# Patient Record
Sex: FEMALE | Race: WHITE | NOT HISPANIC OR LATINO | Employment: UNEMPLOYED | URBAN - METROPOLITAN AREA
[De-identification: names, ages, dates, MRNs, and addresses within clinical notes are randomized per-mention and may not be internally consistent; named-entity substitution may affect disease eponyms.]

---

## 2017-01-11 ENCOUNTER — ROUTINE PRENATAL (OUTPATIENT)
Dept: OBGYN | Facility: CLINIC | Age: 32
End: 2017-01-11
Payer: COMMERCIAL

## 2017-01-11 VITALS — DIASTOLIC BLOOD PRESSURE: 72 MMHG | WEIGHT: 293 LBS | BODY MASS INDEX: 49.32 KG/M2 | SYSTOLIC BLOOD PRESSURE: 114 MMHG

## 2017-01-11 DIAGNOSIS — O09.92 HIGH-RISK PREGNANCY, SECOND TRIMESTER: ICD-10-CM

## 2017-01-11 PROCEDURE — 90040 PR PRENATAL FOLLOW UP: CPT | Performed by: OBSTETRICS & GYNECOLOGY

## 2017-01-11 NOTE — PROGRESS NOTES
Pt here for OB F/V. Good fetal movement. Denies LOF, VB.  Marleni report 12/28 in media, follow up later this month  Seq 1 and 2 done

## 2017-01-11 NOTE — MR AVS SNAPSHOT
Little Mitchell   2017 1:00 PM   Routine Prenatal   MRN: [id number]    Department[facility]  [facility]   Dept Phone:  [phone]    Description:  Female : 1985   Provider:  Rajan Mora M.D.           Allergies as of 2017     Allergen Noted Reactions    Asa [Aspirin] 2014       tachycardia      You were diagnosed with     High-risk pregnancy, second trimester   [369582]         Vital Signs     Blood Pressure Weight Last Menstrual Period Smoking Status          114/72 mmHg 142.883 kg (315 lb) 2016 Never Smoker         Basic Information     Date Of Birth Sex Race Ethnicity Preferred Language    1985 Female White Non- English      Your appointments     2017 10:30 AM   OB Follow Up with Rajan Mora M.D.   [facility] ([address], [address]  [facility] [address] [phone]              Problem List              ICD-10-CM Priority Class Noted - Resolved    High-risk pregnancy O09.90   11/3/2016 - Present    Morbid obesity due to excess calories (HCC) E66.01   11/3/2016 - Present    SLE with normal kidneys (HCC) M32.9   2016 - Present      Health Maintenance        Date Due Completion Dates    IMM INFLUENZA (1) 2016 1/3/2013, 2011    PAP SMEAR 2017    IMM DTaP/Tdap/Td Vaccine (4 - Td) 2021, 10/17/2007, 2003            Current Immunizations     Hepatitis B Vaccine Non-Recombivax (Ped/Adol) 2000, 2000, 1999    Influenza Vaccine Quad Inj (Preserved) 1/3/2013, 2011    MMR Vaccine 10/1/2009, 1988    TD Vaccine 2003    Tdap Vaccine 2011, 10/17/2007    Tuberculin Skin Test 12/10/2015      Below and/or attached are the medications your provider expects you to take. Review all of your home medications and newly ordered medications with your provider and/or pharmacist. Follow medication instructions as directed by your provider  and/or pharmacist. Please keep your medication list with you and share with your provider. Update the information when medications are discontinued, doses are changed, or new medications (including over-the-counter products) are added; and carry medication information at all times in the event of emergency situations     Allergies:  ASA - (reactions not documented)               Medications  Valid as of: January 11, 2017 -  1:32 PM    Generic Name Brand Name Tablet Size Instructions for use    Cyclobenzaprine HCl (Tab) FLEXERIL 10 MG Take 1 Tab by mouth 3 times a day as needed for Mild Pain.        Cyclobenzaprine HCl (Tab) FLEXERIL 10 MG Take 1 Tab by mouth 3 times a day as needed for Mild Pain.        Hydrocodone-Acetaminophen (Tab) NORCO 5-325 MG Take 1-2 Tabs by mouth every four hours as needed.        MethylPREDNISolone (Tab) MEDROL 4 MG 6 DAY TAPER        Naproxen (Tab) NAPROSYN 500 MG Take 1 Tab by mouth 2 times a day, with meals.        .                 Medicines prescribed today were sent to:     None      Medication refill instructions:       If your prescription bottle indicates you have medication refills left, it is not necessary to call your provider’s office. Please contact your pharmacy and they will refill your medication.    If your prescription bottle indicates you do not have any refills left, you may request refills at any time through one of the following ways: The online [employer] system (except Urgent Care), by calling your provider’s office, or by asking your pharmacy to contact your provider’s office with a refill request. Medication refills are processed only during regular business hours and may not be available until the next business day. Your provider may request additional information or to have a follow-up visit with you prior to refilling your medication.   *Please Note: Medication refills are assigned a new Rx number when refilled electronically. Your pharmacy may indicate that no  refills were authorized even though a new prescription for the same medication is available at the pharmacy. Please request the medicine by name with the pharmacy before contacting your provider for a refill.           [employer] Access Code: Activation code not generated  Current "[employer]t Status: Active

## 2017-01-11 NOTE — PROGRESS NOTES
OB Followup;    21w5d    Patient Active Problem List    Diagnosis Date Noted   • SLE with normal kidneys (HCC) 12/05/2016   • High-risk pregnancy 11/03/2016   • Morbid obesity due to excess calories (HCC) 11/03/2016       Filed Vitals:    01/11/17 1300   BP: 114/72   Weight: 142.883 kg (315 lb)       Patient presents for followup of OB care. Currently doing well . Good fetal movement no leakage of fluid no contractions or vaginal bleeding        Size equals dates, normal fetal heart rate      Labs; unable to find results of PHILIP to confirm diagnosis of SLE      Patient has had sequential screening with Dr. Yepez-normal      Labor precautions given  Increase oral hydration    Followup in  4 weeks

## 2017-02-14 ENCOUNTER — ROUTINE PRENATAL (OUTPATIENT)
Dept: OBGYN | Facility: CLINIC | Age: 32
End: 2017-02-14
Payer: COMMERCIAL

## 2017-02-14 VITALS — WEIGHT: 293 LBS | DIASTOLIC BLOOD PRESSURE: 68 MMHG | SYSTOLIC BLOOD PRESSURE: 110 MMHG | BODY MASS INDEX: 48.79 KG/M2

## 2017-02-14 DIAGNOSIS — O09.92 HIGH-RISK PREGNANCY, SECOND TRIMESTER: ICD-10-CM

## 2017-02-14 PROCEDURE — 90040 PR PRENATAL FOLLOW UP: CPT | Performed by: OBSTETRICS & GYNECOLOGY

## 2017-02-14 NOTE — MR AVS SNAPSHOT
Little Mitchell   2017 10:30 AM   Routine Prenatal   MRN: [id number]    [facility]   Dept Phone:  [phone]    Description:  Female : 1985   Provider:  Rajan Mora M.D.           Allergies as of 2017     Allergen Noted Reactions    Asa [Aspirin] 2014       tachycardia      You were diagnosed with     High-risk pregnancy, second trimester   [204320]         Vital Signs     Blood Pressure Weight Last Menstrual Period Smoking Status          110/68 mmHg 141.341 kg (311 lb 9.6 oz) 2016 Never Smoker         Basic Information     Date Of Birth Sex Race Ethnicity Preferred Language    1985 Female White Non- English      Your appointments     Mar 14, 2017 10:30 AM   OB Follow Up with Rajan Mora M.D.   [facility] ([address], [address]  [facility] [id number] [phone]              Problem List              ICD-10-CM Priority Class Noted - Resolved    High-risk pregnancy O09.90   11/3/2016 - Present    Morbid obesity due to excess calories (CMS-HCC) E66.01   11/3/2016 - Present    SLE with normal kidneys (CMS-HCC) M32.9   2016 - Present      Health Maintenance        Date Due Completion Dates    IMM INFLUENZA (1) 2016 1/3/2013, 2011    PAP SMEAR 2017    IMM DTaP/Tdap/Td Vaccine (4 - Td) 2021, 10/17/2007, 2003            Current Immunizations     Hepatitis B Vaccine Non-Recombivax (Ped/Adol) 2000, 2000, 1999    Influenza Vaccine Quad Inj (Preserved) 1/3/2013, 2011    MMR Vaccine 10/1/2009, 1988    TD Vaccine 2003    Tdap Vaccine 2011, 10/17/2007    Tuberculin Skin Test 12/10/2015      Below and/or attached are the medications your provider expects you to take. Review all of your home medications and newly ordered medications with your provider and/or pharmacist. Follow medication instructions as directed by  your provider and/or pharmacist. Please keep your medication list with you and share with your provider. Update the information when medications are discontinued, doses are changed, or new medications (including over-the-counter products) are added; and carry medication information at all times in the event of emergency situations     Allergies:  ASA - (reactions not documented)               Medications  Valid as of: February 14, 2017 - 11:05 AM    Generic Name Brand Name Tablet Size Instructions for use    Cyclobenzaprine HCl (Tab) FLEXERIL 10 MG Take 1 Tab by mouth 3 times a day as needed for Mild Pain.        Cyclobenzaprine HCl (Tab) FLEXERIL 10 MG Take 1 Tab by mouth 3 times a day as needed for Mild Pain.        Hydrocodone-Acetaminophen (Tab) NORCO 5-325 MG Take 1-2 Tabs by mouth every four hours as needed.        MethylPREDNISolone (Tab) MEDROL 4 MG 6 DAY TAPER        Naproxen (Tab) NAPROSYN 500 MG Take 1 Tab by mouth 2 times a day, with meals.        .                 Medicines prescribed today were sent to:     None      Medication refill instructions:       If your prescription bottle indicates you have medication refills left, it is not necessary to call your provider’s office. Please contact your pharmacy and they will refill your medication.    If your prescription bottle indicates you do not have any refills left, you may request refills at any time through one of the following ways: The online [employer] system (except Urgent Care), by calling your provider’s office, or by asking your pharmacy to contact your provider’s office with a refill request. Medication refills are processed only during regular business hours and may not be available until the next business day. Your provider may request additional information or to have a follow-up visit with you prior to refilling your medication.   *Please Note: Medication refills are assigned a new Rx number when refilled electronically. Your pharmacy may  indicate that no refills were authorized even though a new prescription for the same medication is available at the pharmacy. Please request the medicine by name with the pharmacy before contacting your provider for a refill.        Your To Do List     Future Labs/Procedures Complete By Expires    CBC WITH DIFFERENTIAL  As directed 2/14/2018    GLUCOSE 1HR GESTATIONAL  As directed 2/15/2018    T.PALLIDUM AB EIA  As directed 2/15/2018         MyChart Access Code: Activation code not generated  Current MyChart Status: Active

## 2017-02-14 NOTE — PROGRESS NOTES
OB Followup;    26w4d    Patient Active Problem List    Diagnosis Date Noted   • SLE with normal kidneys (CMS-HCC) 12/05/2016   • High-risk pregnancy 11/03/2016   • Morbid obesity due to excess calories (CMS-HCC) 11/03/2016       Filed Vitals:    02/14/17 1049   BP: 110/68   Weight: 141.341 kg (311 lb 9.6 oz)       Patient presents for followup of OB care. Currently doing well . Good fetal movement no leakage of fluid no contractions or vaginal bleeding        Size equals dates, normal fetal heart rate      Labs; patient given lab slip for CBC GTT and RPR    Labor precautions given  Increase oral hydration    Followup in 4 weeks

## 2017-02-27 ENCOUNTER — TELEPHONE (OUTPATIENT)
Dept: OBGYN | Facility: CLINIC | Age: 32
End: 2017-02-27

## 2017-02-27 DIAGNOSIS — O09.93 HIGH-RISK PREGNANCY, THIRD TRIMESTER: ICD-10-CM

## 2017-02-27 NOTE — TELEPHONE ENCOUNTER
Called pt back   Informed her that it can be done with an amenositsis or after the baby is born  Pt verblaized understanding

## 2017-03-03 ENCOUNTER — TELEPHONE (OUTPATIENT)
Dept: OBGYN | Facility: CLINIC | Age: 32
End: 2017-03-03

## 2017-03-03 NOTE — TELEPHONE ENCOUNTER
Patient called in regards to a lab order for ppd test. She stated that she talked to zechariah about this. I looked in the system and the order was cancelled. I spoke with zechariah and she said the pt would need to get it from her PCP

## 2017-03-12 ENCOUNTER — HOSPITAL ENCOUNTER (OUTPATIENT)
Facility: MEDICAL CENTER | Age: 32
End: 2017-03-13
Attending: OBSTETRICS & GYNECOLOGY | Admitting: OBSTETRICS & GYNECOLOGY
Payer: COMMERCIAL

## 2017-03-12 LAB — FIBRONECTIN FETAL SPEC QL: NEGATIVE

## 2017-03-12 PROCEDURE — 82731 ASSAY OF FETAL FIBRONECTIN: CPT

## 2017-03-12 PROCEDURE — 84112 EVAL AMNIOTIC FLUID PROTEIN: CPT

## 2017-03-13 VITALS — SYSTOLIC BLOOD PRESSURE: 137 MMHG | DIASTOLIC BLOOD PRESSURE: 69 MMHG | HEART RATE: 90 BPM

## 2017-03-13 LAB — A1 MICROGLOB PLACENTAL VAG QL: NEGATIVE

## 2017-03-13 PROCEDURE — 59025 FETAL NON-STRESS TEST: CPT | Performed by: OBSTETRICS & GYNECOLOGY

## 2017-03-13 NOTE — PROGRESS NOTES
"31 y.o. , EDC 17 (30.2 wks)     -Pt c/o contractions that started this morning, \"maybe a couple times an hour and I feel like I need to push when I have a contraction.\" Also c/o leaking since yesterday (clear fluid, with no big gush)Also c/o decresed fetal movement since this morning, \"I've been drinking apple juice all day but haven't felt him kick as much as he normally does.\"   -Phone call to Dr Ruffin for patient update. Order to collect amnisure & FFN then check cervix.     -FFN & Amnisure collected, sent to lab. No pooling seen with sterile spec. SVE= external os 1cm, internal os closed/thick/-2, unable to assess presenting part. Pt has felt baby kick 3 times since being here, occasional fetal movement heard on US by RN  0-Amnisure & FFN are negative, pt has felt great fetal movement since she's been at the hospital & her contractions have reportedly lessened in frequency after drinking a pitcher of water. No visible leaking of fluid noted on pad since pt has been at hospital. When pt has a reported contraction, no contraction is palpated by RN.   Pt requesting to go home & wants a doctor's note for work. Dr Augustin palmer MD reviewed strip, order received to discharge pt.   0-Pt discharged home in stable condition with discharge instructions & education of when to return to hospital.   "

## 2017-03-14 ENCOUNTER — ROUTINE PRENATAL (OUTPATIENT)
Dept: OBGYN | Facility: CLINIC | Age: 32
End: 2017-03-14
Payer: COMMERCIAL

## 2017-03-14 VITALS — DIASTOLIC BLOOD PRESSURE: 82 MMHG | BODY MASS INDEX: 47.45 KG/M2 | WEIGHT: 293 LBS | SYSTOLIC BLOOD PRESSURE: 132 MMHG

## 2017-03-14 DIAGNOSIS — O09.93 HIGH-RISK PREGNANCY, THIRD TRIMESTER: ICD-10-CM

## 2017-03-14 PROCEDURE — 90040 PR PRENATAL FOLLOW UP: CPT | Performed by: OBSTETRICS & GYNECOLOGY

## 2017-03-14 NOTE — PROGRESS NOTES
Pt here for OB F/V. Good fetal movement. Denies, VB.  L+D 3/12-- contractions, LOF, DFM. FFN -  28 week labs not done yet  Pt had Rhogam in ER- proof of inj scanned in media

## 2017-03-14 NOTE — PROGRESS NOTES
OB Followup;    30w4d    Patient Active Problem List    Diagnosis Date Noted   • SLE with normal kidneys (CMS-HCC) 2016   • High-risk pregnancy 2016   • Morbid obesity due to excess calories (CMS-HCC) 2016       Filed Vitals:    17 1027   BP: 132/82   Weight: 137.44 kg (303 lb)       Patient presents for followup of OB care. Currently doing well . Good fetal movement no leakage of fluid no contractions or vaginal bleeding        Size equals dates, normal fetal heart rate      Labs; patient still has not perform CBC GTT and RPR-she states she will perform labs this weekend  Patient was seen on labor and delivery for  contractions FFN negative cervix 1 cm dilated and the patient did receive RhoGAM on labor and delivery    Labor precautions given  Increase oral hydration    Followup in 2 weeks    Recommend NSTs twice weekly starting at 32 weeks due to history of lupus

## 2017-03-14 NOTE — MR AVS SNAPSHOT
Little Mitchell   3/14/2017 10:30 AM   Routine Prenatal   MRN: [id number]    Department:  [facility]   Dept Phone:  [phone]    Description:  Female : 1985   Provider:  Rajan Mora M.D.           Allergies as of 3/14/2017     Allergen Noted Reactions    Asa [Aspirin] 2014       tachycardia      You were diagnosed with     High-risk pregnancy, third trimester   [365127]         Vital Signs     Blood Pressure Weight Last Menstrual Period Smoking Status          132/82 mmHg 137.44 kg (303 lb) 2016 Never Smoker         Basic Information     Date Of Birth Sex Race Ethnicity Preferred Language    1985 Female White Non- English      Your appointments     2017 10:45 AM   OB Follow Up with Rajan Mora M.D.   [facility] ([address])    [address] [place] [phone]            2017 10:30 AM   OB Follow Up with Rajan Mora M.D.   [facility] ([address])    [address] [place] [phone]            2017 10:30 AM   OB Follow Up with Rajan Mora M.D.   [facility] ([address])    [address] [place] [phone]            May 03, 2017 10:00 AM   OB Follow Up with Rajan Mora M.D.   [address] [phone]            May 09, 2017 10:30 AM   OB Follow Up with Rajan Mora M.D.   [address] [phone]            May 15, 2017 10:30 AM   OB Follow Up with Rajan Mora M.D.   [address] [phone]              Problem List              ICD-10-CM Priority Class Noted -  Resolved    High-risk pregnancy O09.90   11/3/2016 - Present    Morbid obesity due to excess calories (CMS-HCC) E66.01   11/3/2016 - Present    SLE with normal kidneys (CMS-HCC) M32.9   12/5/2016 - Present      Health Maintenance        Date Due Completion Dates    IMM INFLUENZA (1) 9/1/2016 1/3/2013, 8/31/2011    PAP SMEAR 2/2/2017 2/2/2014    IMM DTaP/Tdap/Td Vaccine (4 - Td) 5/23/2021 5/23/2011, 10/17/2007, 5/8/2003            Current Immunizations     Hepatitis B Vaccine Non-Recombivax (Ped/Adol) 5/23/2000, 1/26/2000, 11/23/1999    Influenza Vaccine Quad Inj (Preserved) 1/3/2013, 8/31/2011    MMR Vaccine 10/1/2009, 9/21/1988    TD Vaccine 5/8/2003    Tdap Vaccine 5/23/2011, 10/17/2007    Tuberculin Skin Test 12/10/2015      Below and/or attached are the medications your provider expects you to take. Review all of your home medications and newly ordered medications with your provider and/or pharmacist. Follow medication instructions as directed by your provider and/or pharmacist. Please keep your medication list with you and share with your provider. Update the information when medications are discontinued, doses are changed, or new medications (including over-the-counter products) are added; and carry medication information at all times in the event of emergency situations     Allergies:  ASA - (reactions not documented)               Medications  Valid as of: March 14, 2017 - 10:40 AM    Generic Name Brand Name Tablet Size Instructions for use    Cyclobenzaprine HCl (Tab) FLEXERIL 10 MG Take 1 Tab by mouth 3 times a day as needed for Mild Pain.        Cyclobenzaprine HCl (Tab) FLEXERIL 10 MG Take 1 Tab by mouth 3 times a day as needed for Mild Pain.        Hydrocodone-Acetaminophen (Tab) NORCO 5-325 MG Take 1-2 Tabs by mouth every four hours as needed.        MethylPREDNISolone (Tab) MEDROL 4 MG 6 DAY TAPER        Naproxen (Tab) NAPROSYN 500 MG Take 1 Tab by mouth 2 times a day, with meals.        .                   Medicines prescribed today were sent to:     None      Medication refill instructions:       If your prescription bottle indicates you have medication refills left, it is not necessary to call your provider’s office. Please contact your pharmacy and they will refill your medication.    If your prescription bottle indicates you do not have any refills left, you may request refills at any time through one of the following ways: The online [employer] system (except Urgent Care), by calling your provider’s office, or by asking your pharmacy to contact your provider’s office with a refill request. Medication refills are processed only during regular business hours and may not be available until the next business day. Your provider may request additional information or to have a follow-up visit with you prior to refilling your medication.   *Please Note: Medication refills are assigned a new Rx number when refilled electronically. Your pharmacy may indicate that no refills were authorized even though a new prescription for the same medication is available at the pharmacy. Please request the medicine by name with the pharmacy before contacting your provider for a refill.           [employer] Access Code: Activation code not generated  Current [employer] Status: Active

## 2017-04-03 ENCOUNTER — HOSPITAL ENCOUNTER (OUTPATIENT)
Facility: MEDICAL CENTER | Age: 32
End: 2017-04-03
Attending: OBSTETRICS & GYNECOLOGY | Admitting: OBSTETRICS & GYNECOLOGY
Payer: COMMERCIAL

## 2017-04-03 VITALS
TEMPERATURE: 98.1 F | SYSTOLIC BLOOD PRESSURE: 133 MMHG | HEART RATE: 89 BPM | HEIGHT: 60 IN | WEIGHT: 293 LBS | DIASTOLIC BLOOD PRESSURE: 86 MMHG | BODY MASS INDEX: 57.52 KG/M2

## 2017-04-03 LAB
APPEARANCE UR: CLEAR
COLOR UR AUTO: YELLOW
FIBRONECTIN FETAL SPEC QL: NEGATIVE
GLUCOSE UR QL STRIP.AUTO: NEGATIVE MG/DL
KETONES UR QL STRIP.AUTO: NEGATIVE MG/DL
LEUKOCYTE ESTERASE UR QL STRIP.AUTO: ABNORMAL
NITRITE UR QL STRIP.AUTO: NEGATIVE
PH UR STRIP.AUTO: 6 [PH]
PROT UR QL STRIP: NEGATIVE MG/DL
RBC UR QL AUTO: NEGATIVE
SP GR UR: 1.01

## 2017-04-03 PROCEDURE — 81002 URINALYSIS NONAUTO W/O SCOPE: CPT

## 2017-04-03 PROCEDURE — 82731 ASSAY OF FETAL FIBRONECTIN: CPT

## 2017-04-03 PROCEDURE — 59025 FETAL NON-STRESS TEST: CPT | Performed by: OBSTETRICS & GYNECOLOGY

## 2017-04-04 NOTE — PROGRESS NOTES
Presents with perineal pressure, swollen vagina, light spotting, a sharp pain that starts at her upper abdomen and extends to her vagina, and abd tenderness and diarrhea. Denies LOF; reports FM.  Report given to Dr. Flores, orders received. FFN collected and SVE 1/thick/high; unable to reach presenting part. FFN negative, d/c orders obtained. PTL precautions provided; educated about the importance of doing kick counts and drinking plenty of water

## 2017-04-06 ENCOUNTER — ROUTINE PRENATAL (OUTPATIENT)
Dept: OBGYN | Facility: CLINIC | Age: 32
End: 2017-04-06
Payer: COMMERCIAL

## 2017-04-06 VITALS — WEIGHT: 293 LBS | BODY MASS INDEX: 58 KG/M2 | SYSTOLIC BLOOD PRESSURE: 118 MMHG | DIASTOLIC BLOOD PRESSURE: 82 MMHG

## 2017-04-06 DIAGNOSIS — Q33.0 CONGENITAL PULMONARY AIRWAY MALFORMATION (CPAM): ICD-10-CM

## 2017-04-06 DIAGNOSIS — O09.93 HIGH-RISK PREGNANCY, THIRD TRIMESTER: ICD-10-CM

## 2017-04-06 PROCEDURE — 90040 PR PRENATAL FOLLOW UP: CPT | Performed by: OBSTETRICS & GYNECOLOGY

## 2017-04-06 NOTE — MR AVS SNAPSHOT
Little Mitchell   2017 10:45 AM   Routine Prenatal   MRN: [id number]    Department:  [facility]   Dept Phone:  [phone]    Description:  Female : 1985   Provider:  Rajan Mora M.D.           Allergies as of 2017     Allergen Noted Reactions    Asa [Aspirin] 2014       tachycardia      You were diagnosed with     High-risk pregnancy, third trimester   [493692]         Vital Signs     Blood Pressure Weight Last Menstrual Period Smoking Status          118/82 mmHg 134.718 kg (297 lb) 2016 Never Smoker         Basic Information     Date Of Birth Sex Race Ethnicity Preferred Language    1985 Female White Non- English      Your appointments     2017 10:30 AM   OB Follow Up with Rajan Mora M.D.   [facility] ([address])    [address] [place] [phone]            2017 10:30 AM   OB Follow Up with Rajan Mora M.D.   [facility] ([address])    [address] [place] [phone]            May 03, 2017 10:00 AM   OB Follow Up with Rajan Mora M.D.   [facility] ([address])    [address] [place] [phone]            May 07, 2017  8:00 AM   [facility] INDUCTION OF LABOR with NON-SURGICAL L&D   LABOR & DELIVERY [facility] (--)    [address] [facility] [address]   [phone]            May 09, 2017 10:30 AM   OB Follow Up with Rajan Mora M.D.   [facility] ([address])    [address] [place] [phone]            May 15, 2017 10:30 AM   OB Follow Up with Rajan Mora M.D.   [facility] ([address])    [address] [place] [address] [phone]              Problem List              ICD-10-CM Priority Class Noted - Resolved    High-risk pregnancy  O09.90   11/3/2016 - Present    Morbid obesity due to excess calories (CMS-HCC) E66.01   11/3/2016 - Present    SLE with normal kidneys (CMS-HCC) M32.9   12/5/2016 - Present      Health Maintenance        Date Due Completion Dates    PAP SMEAR 2/2/2017 2/2/2014    IMM DTaP/Tdap/Td Vaccine (4 - Td) 5/23/2021 5/23/2011, 10/17/2007, 5/8/2003            Current Immunizations     Hepatitis B Vaccine Non-Recombivax (Ped/Adol) 5/23/2000, 1/26/2000, 11/23/1999    Influenza Vaccine Quad Inj (Preserved) 1/3/2013, 8/31/2011    MMR Vaccine 10/1/2009, 9/21/1988    TD Vaccine 5/8/2003    Tdap Vaccine 5/23/2011, 10/17/2007    Tuberculin Skin Test 12/10/2015      Below and/or attached are the medications your provider expects you to take. Review all of your home medications and newly ordered medications with your provider and/or pharmacist. Follow medication instructions as directed by your provider and/or pharmacist. Please keep your medication list with you and share with your provider. Update the information when medications are discontinued, doses are changed, or new medications (including over-the-counter products) are added; and carry medication information at all times in the event of emergency situations     Allergies:  ASA - (reactions not documented)               Medications  Valid as of: April 06, 2017 - 11:26 AM    Generic Name Brand Name Tablet Size Instructions for use    Cyclobenzaprine HCl (Tab) FLEXERIL 10 MG Take 1 Tab by mouth 3 times a day as needed for Mild Pain.        Cyclobenzaprine HCl (Tab) FLEXERIL 10 MG Take 1 Tab by mouth 3 times a day as needed for Mild Pain.        Hydrocodone-Acetaminophen (Tab) NORCO 5-325 MG Take 1-2 Tabs by mouth every four hours as needed.        MethylPREDNISolone (Tab) MEDROL 4 MG 6 DAY TAPER        Naproxen (Tab) NAPROSYN 500 MG Take 1 Tab by mouth 2 times a day, with meals.        .                 Medicines prescribed today were sent to:     [id number] - [place]UNA FORD  - [address] [place], [place] [address]TARYN [address] [place] [address]    Phone: [phone] Fax: [phone]    Open 24 Hours?: No      Medication refill instructions:       If your prescription bottle indicates you have medication refills left, it is not necessary to call your provider’s office. Please contact your pharmacy and they will refill your medication.    If your prescription bottle indicates you do not have any refills left, you may request refills at any time through one of the following ways: The online [employer] system (except Urgent Care), by calling your provider’s office, or by asking your pharmacy to contact your provider’s office with a refill request. Medication refills are processed only during regular business hours and may not be available until the next business day. Your provider may request additional information or to have a follow-up visit with you prior to refilling your medication.   *Please Note: Medication refills are assigned a new Rx number when refilled electronically. Your pharmacy may indicate that no refills were authorized even though a new prescription for the same medication is available at the pharmacy. Please request the medicine by name with the pharmacy before contacting your provider for a refill.           [employer] Access Code: Activation code not generated  Current [employer] Status: Active

## 2017-04-06 NOTE — Clinical Note
"Count Your Baby's Movements  Another step to a healthy delivery                Dept: [phone]    How Many Weeks Pregnant? 33w6d    Date to Begin Countin17              How to use this chart    One way for your physician to keep track of your baby's health is by knowing how often the baby moves (or \"kicks\") in your womb.  You can help your physician to do this by using this chart every day.    Every day, you should see how many hours it takes for your baby to move 10 times.  Start in the morning, as soon as you get up.    · First, write down the time your baby moves until you get to 10.  · Check off one box every time your baby moves until you get to 10.  · Write down the time you finished counting in the last column.  · Total how long it took to count up all 10 movements.  · Finally, fill in the box that shows how long this took.  After counting 10 movements, you no longer have to count any more that day.  The next morning, just start counting again as soon as you get up.    What should you call a \"movement\"?  It is hard to say, because it will feel different from one mother to another and from one pregnancy to the next.  The important thing is that you count the movements the same way throughout your pregnancy.  If you have more questions, you should ask your physician.    Count carefully every day!  SAMPLE:  Week 28    How many hours did it take to feel 10 movements?       Start  Time     1     2     3     4     5     6     7     8     9     10   Finish Time   Mon 8:20 ·  ·  ·  ·  ·  ·  ·  ·  ·  ·  11:40                  Sat               Sun                 IMPORTANT: You should contact your physician if it takes more than two hours for you to feel 10 movements.  Each morning, write down the time and start to count the movements of your baby.  Keep track by checking off one box every time you feel one movement.  When you have felt 10 \"kicks\", " write down the time you finished counting in the last column.  Then fill in the   box (over the check babs) for the number of hours it took.  Be sure to read the complete instructions on the previous page.

## 2017-04-06 NOTE — PROGRESS NOTES
OB Followup;    33w6d    Patient Active Problem List    Diagnosis Date Noted   • SLE with normal kidneys (CMS-HCC) 12/05/2016   • High-risk pregnancy 11/03/2016   • Morbid obesity due to excess calories (CMS-HCC) 11/03/2016       Filed Vitals:    04/06/17 1109   BP: 118/82   Weight: 134.718 kg (297 lb)       Patient presents for followup of OB care. Currently doing well . Good fetal movement no leakage of fluid no contractions or vaginal bleeding        Size equals dates, normal fetal heart rate    Patient since she was    Labor precautions given  Increase oral hydration    Followup in 2 weeks        Most recent ultrasound on 3/20/17 with Dr. Yepez shows slightly smaller measurement of CPAM measuring 3.47 cm x 2.60 cm x 2.57 cm but there is no evidence of hydrops pericardial effusion or pleural effusion.  In addition, no evidence of IUGR or oligohydramnios at this time        The patient states that she has pressure in her vagina from sitting while at work as a . We discussed this is a normal part of pregnancy and not an abnormal pathologic problem or diagnosis.  The patient states that she would like to start her maternity leave right now-a letter was given to the patient to start maternity leave-we cannot give her a letter stating that she has a medical complication necessitating medical leave for this indication.    Dr. Yepez indicated that delivery should occur at approximately 38 weeks-induction of labor has been scheduled for May 7 at 8 AM due to CPAM which has a risk of causing fetal hydrops and can be associated with IUGR     In addition, Dr. Tony has been notified about congenital pulmonary airway malformation for follow-up after delivery and possible surgery is needed.    Patient has scheduled follow up for fetal growth studies Dopplers and measurement of congenital pulmonary airway malformation with Dr. Yepez

## 2017-04-06 NOTE — Clinical Note
April 6, 2017       Patient: Little Mitchell   YOB: 1985   Date of Visit: 4/6/2017         To Whom It May Concern:    It is my medical opinion that Little Mitchell should start her maternity leave today (4/6/2017).    If you have any questions or concerns, please don't hesitate to call [phone]          Sincerely,          Rajan Mora M.D.  Electronically Signed

## 2017-04-18 ENCOUNTER — ROUTINE PRENATAL (OUTPATIENT)
Dept: OBGYN | Facility: CLINIC | Age: 32
End: 2017-04-18
Payer: COMMERCIAL

## 2017-04-18 ENCOUNTER — HOSPITAL ENCOUNTER (OUTPATIENT)
Facility: MEDICAL CENTER | Age: 32
End: 2017-04-18
Attending: OBSTETRICS & GYNECOLOGY
Payer: COMMERCIAL

## 2017-04-18 VITALS — SYSTOLIC BLOOD PRESSURE: 122 MMHG | DIASTOLIC BLOOD PRESSURE: 82 MMHG | WEIGHT: 293 LBS | BODY MASS INDEX: 58.2 KG/M2

## 2017-04-18 DIAGNOSIS — O09.93 HIGH-RISK PREGNANCY, THIRD TRIMESTER: ICD-10-CM

## 2017-04-18 DIAGNOSIS — M32.9 SLE WITH NORMAL KIDNEYS (HCC): ICD-10-CM

## 2017-04-18 LAB
NST ACOUSTIC STIMULATION: YES
NST ACTION NECESSARY: NORMAL
NST ASSESSMENT: REACTIVE
NST BASELINE: 140
NST INDICATIONS: NORMAL
NST OTHER DATA: NORMAL
NST READ BY: NORMAL
NST RETURN: NORMAL
NST UTERINE ACTIVITY: NORMAL

## 2017-04-18 PROCEDURE — 90040 PR PRENATAL FOLLOW UP: CPT | Performed by: OBSTETRICS & GYNECOLOGY

## 2017-04-18 PROCEDURE — 59025 FETAL NON-STRESS TEST: CPT | Performed by: OBSTETRICS & GYNECOLOGY

## 2017-04-18 PROCEDURE — 87653 STREP B DNA AMP PROBE: CPT

## 2017-04-18 NOTE — PROGRESS NOTES
Pt here for OB F/V. Good fetal movement. Denies LOF, VB.  PT choosing not to do 28 week labs.  GBS today  Wants cervix check

## 2017-04-18 NOTE — PROGRESS NOTES
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 Currently doing well . Good fetal movement no leakage of fluid no contractions or vaginal bleeding        Size equals dates, normal fetal heart rate    Patient's weight has stabilized and climbed slightly-she states her appetite is slightly better    Labs; GBS culture taken    Labor precautions given  Increase oral hydration    Consultation note with Dr. Yepez on 3/30/17 reveals drop from 44th percentile to 26 percentile for growth. Cord Dopplers are normal, JAQUAN 12.3- CPAM has enlarged slightly 3.1 cm x 2.98 cm x 3.3 cm-patient scheduled for follow-up with Dr. Yepez for growth scan    Continue twice weekly NSTs    Patient is scheduled for induction of labor on 5/7/17 at 8 AM as as per Dr. Yepez's recommendations. Fetus cephalic presentation on ultrasound performed by Dr. Yepez

## 2017-04-18 NOTE — MR AVS SNAPSHOT
Little Mitchell   2017 10:30 AM   Routine Prenatal   MRN: [id number]    Department:  [facility]   Dept Phone:  [phone]    Description:  Female : 1985   Provider:  Rajan Mora M.D.           Allergies as of 2017     Allergen Noted Reactions    Asa [Aspirin] 2014       tachycardia      You were diagnosed with     High-risk pregnancy, third trimester   [890188]       SLE with normal kidneys (CMS-HCC)   [937016]         Vital Signs     Blood Pressure Weight Last Menstrual Period Smoking Status          122/82 mmHg 135.172 kg (298 lb) 2016 Never Smoker         Basic Information     Date Of Birth Sex Race Ethnicity Preferred Language    1985 Female White Non- English      Your appointments     2017 10:30 AM   Fetal Non-Stress Test with OBGYN E2 NST ROOM   [facility] ([address])    [address] [place] [id number] [phone]            2017 10:30 AM   OB Follow Up with Rajan Mora M.D.   [facility] ([address])    [address] [place] [address], [address] [phone]            May 03, 2017 10:00 AM   OB Follow Up with Rajan Mora M.D.   [facility] ([address])    [address] [place] [address], [address] [id number] [phone]            May 07, 2017  8:00 AM   TPC INDUCTION OF LABOR with NON-SURGICAL L&D   LABOR & DELIVERY [facility] (--)    [address] [facility] [address]   [phone]              Problem List              ICD-10-CM Priority Class Noted - Resolved    High-risk pregnancy O09.90   11/3/2016 - Present    Morbid obesity due to excess calories (CMS-HCC) E66.01   11/3/2016 - Present    SLE with normal kidneys (CMS-HCC) M32.9   2016 - Present    Congenital pulmonary airway malformation (CPAM) Q33.0   2017 - Present      Health Maintenance        Date Due Completion Dates    PAP SMEAR 2017  2/2/2014    IMM DTaP/Tdap/Td Vaccine (4 - Td) 5/23/2021 5/23/2011, 10/17/2007, 5/8/2003            Current Immunizations     Hepatitis B Vaccine Non-Recombivax (Ped/Adol) 5/23/2000, 1/26/2000, 11/23/1999    Influenza Vaccine Quad Inj (Preserved) 1/3/2013, 8/31/2011    MMR Vaccine 10/1/2009, 9/21/1988    TD Vaccine 5/8/2003    Tdap Vaccine 5/23/2011, 10/17/2007    Tuberculin Skin Test 12/10/2015      Below and/or attached are the medications your provider expects you to take. Review all of your home medications and newly ordered medications with your provider and/or pharmacist. Follow medication instructions as directed by your provider and/or pharmacist. Please keep your medication list with you and share with your provider. Update the information when medications are discontinued, doses are changed, or new medications (including over-the-counter products) are added; and carry medication information at all times in the event of emergency situations     Allergies:  ASA - (reactions not documented)               Medications  Valid as of: April 18, 2017 - 12:09 PM    Generic Name Brand Name Tablet Size Instructions for use    Cyclobenzaprine HCl (Tab) FLEXERIL 10 MG Take 1 Tab by mouth 3 times a day as needed for Mild Pain.        Cyclobenzaprine HCl (Tab) FLEXERIL 10 MG Take 1 Tab by mouth 3 times a day as needed for Mild Pain.        Hydrocodone-Acetaminophen (Tab) NORCO 5-325 MG Take 1-2 Tabs by mouth every four hours as needed.        MethylPREDNISolone (Tab) MEDROL 4 MG 6 DAY TAPER        Naproxen (Tab) NAPROSYN 500 MG Take 1 Tab by mouth 2 times a day, with meals.        .                 Medicines prescribed today were sent to:     [id number] - [place], [place] - [address] [facility] AT [address] [place] [address]    Phone: [phone] Fax: [phone]    Open 24 Hours?: No      Medication refill instructions:       If your prescription bottle indicates you have medication  refills left, it is not necessary to call your provider’s office. Please contact your pharmacy and they will refill your medication.    If your prescription bottle indicates you do not have any refills left, you may request refills at any time through one of the following ways: The online RetiDiag system (except Urgent Care), by calling your provider’s office, or by asking your pharmacy to contact your provider’s office with a refill request. Medication refills are processed only during regular business hours and may not be available until the next business day. Your provider may request additional information or to have a follow-up visit with you prior to refilling your medication.   *Please Note: Medication refills are assigned a new Rx number when refilled electronically. Your pharmacy may indicate that no refills were authorized even though a new prescription for the same medication is available at the pharmacy. Please request the medicine by name with the pharmacy before contacting your provider for a refill.           RetiDiag Access Code: Activation code not generated  Current RetiDiag Status: Active

## 2017-04-19 LAB
AMBIGUOUS DTTM AMBI4: NORMAL
SIGNIFICANT IND 70042: NORMAL
SITE SITE: NORMAL
SOURCE SOURCE: NORMAL

## 2017-04-20 LAB — GP B STREP DNA SPEC QL NAA+PROBE: NEGATIVE

## 2017-04-21 ENCOUNTER — ROUTINE PRENATAL (OUTPATIENT)
Dept: OBGYN | Facility: CLINIC | Age: 32
End: 2017-04-21
Payer: COMMERCIAL

## 2017-04-21 DIAGNOSIS — Q33.0 CONGENITAL PULMONARY AIRWAY MALFORMATION (CPAM): ICD-10-CM

## 2017-04-21 DIAGNOSIS — O28.8 NST (NON-STRESS TEST) NONREACTIVE: ICD-10-CM

## 2017-04-21 LAB
NST ACOUSTIC STIMULATION: YES
NST ACTION NECESSARY: ABNORMAL
NST ASSESSMENT: NONREACTIVE
NST BASELINE: 140
NST INDICATIONS: ABNORMAL
NST OTHER DATA: ABNORMAL
NST READ BY: ABNORMAL
NST RETURN: ABNORMAL
NST UTERINE ACTIVITY: ABNORMAL

## 2017-04-21 PROCEDURE — 90040 PR PRENATAL FOLLOW UP: CPT | Performed by: OBSTETRICS & GYNECOLOGY

## 2017-04-21 PROCEDURE — 76819 FETAL BIOPHYS PROFIL W/O NST: CPT | Performed by: OBSTETRICS & GYNECOLOGY

## 2017-04-21 PROCEDURE — 76815 OB US LIMITED FETUS(S): CPT | Performed by: OBSTETRICS & GYNECOLOGY

## 2017-04-21 NOTE — PROGRESS NOTES
NST nonreactive    Limited OB ultrasound; as performed and read by myself; cephalic presentation, JAQUAN 10.3, biophysical profile 8/8, positive gross body movements, positive fine motor movements, positive fetal breathing movements, grade 2 placenta    Dr. Tony is not available on original induction day-rescheduled to 5/1/17 at 8 AM-Dr. Tony needs to be available at time of delivery due to airway malformation

## 2017-04-21 NOTE — MR AVS SNAPSHOT
Littleyoni Mitchell   2017 10:30 AM   Routine Prenatal   MRN: [id number]    Department[facility]   Dept Phone:  [phone]    Description:  Female : 1985   Provider:  Rajan Mora M.D.           Allergies as of 2017     Allergen Noted Reactions    Asa [Aspirin] 2014       tachycardia      You were diagnosed with     Congenital pulmonary airway malformation (CPAM)   [1086891]         Vital Signs     Last Menstrual Period Smoking Status                2016 Never Smoker           Basic Information     Date Of Birth Sex Race Ethnicity Preferred Language    1985 Female White Non- English      Your appointments     2017 10:30 AM   OB Follow Up with Rajan Mora M.D.   [address])    [address] [place] [id number] [phone]            May 03, 2017 10:00 AM   OB Follow Up with Rajan Mora M.D.   [address])    [address] [place] [id number] [phone]            May 07, 2017  8:00 AM   T[facility] INDUCTION OF LABOR with NON-SURGICAL L&D   [place] [facility] [place] [facility] [place])    [address] [facility] [id number] [phone]              Problem List              ICD-10-CM Priority Class Noted - Resolved    High-risk pregnancy O09.90   11/3/2016 - Present    Morbid obesity due to excess calories (CMS-HCC) E66.01   11/3/2016 - Present    SLE with normal kidneys (CMS-HCC) M32.9   2016 - Present    Congenital pulmonary airway malformation (CPAM) Q33.0   2017 - Present      Health Maintenance        Date Due Completion Dates    PAP SMEAR 2017    IMM DTaP/Tdap/Td Vaccine (4 - Td) 2021, 10/17/2007, 2003            Current Immunizations     Hepatitis B Vaccine Non-Recombivax (Ped/Adol) 2000, 2000, 1999    Influenza Vaccine Quad Inj (Preserved) 1/3/2013, 2011    MMR Vaccine  10/1/2009, 9/21/1988    TD Vaccine 5/8/2003    Tdap Vaccine 5/23/2011, 10/17/2007    Tuberculin Skin Test 12/10/2015      Below and/or attached are the medications your provider expects you to take. Review all of your home medications and newly ordered medications with your provider and/or pharmacist. Follow medication instructions as directed by your provider and/or pharmacist. Please keep your medication list with you and share with your provider. Update the information when medications are discontinued, doses are changed, or new medications (including over-the-counter products) are added; and carry medication information at all times in the event of emergency situations     Allergies:  ASA - (reactions not documented)               Medications  Valid as of: April 21, 2017 - 11:23 AM    Generic Name Brand Name Tablet Size Instructions for use    Cyclobenzaprine HCl (Tab) FLEXERIL 10 MG Take 1 Tab by mouth 3 times a day as needed for Mild Pain.        Cyclobenzaprine HCl (Tab) FLEXERIL 10 MG Take 1 Tab by mouth 3 times a day as needed for Mild Pain.        Hydrocodone-Acetaminophen (Tab) NORCO 5-325 MG Take 1-2 Tabs by mouth every four hours as needed.        MethylPREDNISolone (Tab) MEDROL 4 MG 6 DAY TAPER        Naproxen (Tab) NAPROSYN 500 MG Take 1 Tab by mouth 2 times a day, with meals.        .                 Medicines prescribed today were sent to:     [facility]/PHARMACY #[id number] - [place], [place] - [address] [place] [address]    Phone: [phone] Fax: [phone]    Open 24 Hours?: No      Medication refill instructions:       If your prescription bottle indicates you have medication refills left, it is not necessary to call your provider’s office. Please contact your pharmacy and they will refill your medication.    If your prescription bottle indicates you do not have any refills left, you may request refills at any time through one of the following ways: The  online Magnetic system (except Urgent Care), by calling your provider’s office, or by asking your pharmacy to contact your provider’s office with a refill request. Medication refills are processed only during regular business hours and may not be available until the next business day. Your provider may request additional information or to have a follow-up visit with you prior to refilling your medication.   *Please Note: Medication refills are assigned a new Rx number when refilled electronically. Your pharmacy may indicate that no refills were authorized even though a new prescription for the same medication is available at the pharmacy. Please request the medicine by name with the pharmacy before contacting your provider for a refill.           Magnetic Access Code: Activation code not generated  Current Magnetic Status: Active

## 2017-04-25 ENCOUNTER — ROUTINE PRENATAL (OUTPATIENT)
Dept: OBGYN | Facility: CLINIC | Age: 32
End: 2017-04-25
Payer: COMMERCIAL

## 2017-04-25 DIAGNOSIS — Q33.0 CONGENITAL PULMONARY AIRWAY MALFORMATION (CPAM): ICD-10-CM

## 2017-04-25 DIAGNOSIS — O99.113 LUPUS ANTICOAGULANT AFFECTING PREGNANCY IN THIRD TRIMESTER, ANTEPARTUM (HCC): ICD-10-CM

## 2017-04-25 DIAGNOSIS — D68.62 LUPUS ANTICOAGULANT AFFECTING PREGNANCY IN THIRD TRIMESTER, ANTEPARTUM (HCC): ICD-10-CM

## 2017-04-25 LAB
NST ACOUSTIC STIMULATION: NORMAL
NST ACTION NECESSARY: NORMAL
NST ASSESSMENT: NORMAL
NST BASELINE: NORMAL
NST INDICATIONS: NORMAL
NST OTHER DATA: NORMAL
NST READ BY: NORMAL
NST RETURN: NORMAL
NST UTERINE ACTIVITY: NORMAL

## 2017-04-25 PROCEDURE — 59025 FETAL NON-STRESS TEST: CPT | Performed by: OBSTETRICS & GYNECOLOGY

## 2017-04-25 NOTE — MR AVS SNAPSHOT
Little Mitchell   2017 9:45 AM   Routine Prenatal   MRN: [id number]    Department:  [facility]   Dept Phone:  [phone]    Description:  Female : 1985   Provider:  Johana Ruffin M.D.           Allergies as of 2017     Allergen Noted Reactions    Asa [Aspirin] 2014       tachycardia      You were diagnosed with     Congenital pulmonary airway malformation (CPAM)   [2877466]       Lupus anticoagulant affecting pregnancy in third trimester, antepartum (CMS-HCC)   [3589365]         Vital Signs     Last Menstrual Period Smoking Status                2016 Never Smoker           Basic Information     Date Of Birth Sex Race Ethnicity Preferred Language    1985 Female White Non- English      Your appointments     2017 10:30 AM   OB Follow Up with Rajan Mora M.D.   [facility] ([address])    [address] [place] [phone]            May 01, 2017  8:00 AM   T[facility] INDUCTION OF LABOR with NON-SURGICAL L&D   [place] [facility] [place])    [address] [facility] [address]   [phone]            May 03, 2017 10:00 AM   OB Follow Up with Rajan Mora M.D.   [facility] ([address])    [address] [place] [address], [address] [address]   [phone]              Problem List              ICD-10-CM Priority Class Noted - Resolved    High-risk pregnancy O09.90   11/3/2016 - Present    Morbid obesity due to excess calories (CMS-HCC) E66.01   11/3/2016 - Present    SLE with normal kidneys (CMS-HCC) M32.9   2016 - Present    Congenital pulmonary airway malformation (CPAM) Q33.0   2017 - Present      Health Maintenance        Date Due Completion Dates    PAP SMEAR 2017    IMM DTaP/Tdap/Td Vaccine (4 - Td) 2021, 10/17/2007, 2003            Current Immunizations     Hepatitis B Vaccine Non-Recombivax (Ped/Adol) 2000, 2000,  11/23/1999    Influenza Vaccine Quad Inj (Preserved) 1/3/2013, 8/31/2011    MMR Vaccine 10/1/2009, 9/21/1988    TD Vaccine 5/8/2003    Tdap Vaccine 5/23/2011, 10/17/2007    Tuberculin Skin Test 12/10/2015      Below and/or attached are the medications your provider expects you to take. Review all of your home medications and newly ordered medications with your provider and/or pharmacist. Follow medication instructions as directed by your provider and/or pharmacist. Please keep your medication list with you and share with your provider. Update the information when medications are discontinued, doses are changed, or new medications (including over-the-counter products) are added; and carry medication information at all times in the event of emergency situations     Allergies:  ASA - (reactions not documented)               Medications  Valid as of: April 25, 2017 - 10:35 AM    Generic Name Brand Name Tablet Size Instructions for use    Cyclobenzaprine HCl (Tab) FLEXERIL 10 MG Take 1 Tab by mouth 3 times a day as needed for Mild Pain.        Cyclobenzaprine HCl (Tab) FLEXERIL 10 MG Take 1 Tab by mouth 3 times a day as needed for Mild Pain.        Hydrocodone-Acetaminophen (Tab) NORCO 5-325 MG Take 1-2 Tabs by mouth every four hours as needed.        MethylPREDNISolone (Tab) MEDROL 4 MG 6 DAY TAPER        Naproxen (Tab) NAPROSYN 500 MG Take 1 Tab by mouth 2 times a day, with meals.        .                 Medicines prescribed today were sent to:     [facility]/PHARMACY #[id number] - [place], [place] - [address] [address]    Phone: [phone] Fax: [phone]    Open 24 Hours?: No      Medication refill instructions:       If your prescription bottle indicates you have medication refills left, it is not necessary to call your provider’s office. Please contact your pharmacy and they will refill your medication.    If your prescription bottle indicates you do not have any  refills left, you may request refills at any time through one of the following ways: The online Game Blisters system (except Urgent Care), by calling your provider’s office, or by asking your pharmacy to contact your provider’s office with a refill request. Medication refills are processed only during regular business hours and may not be available until the next business day. Your provider may request additional information or to have a follow-up visit with you prior to refilling your medication.   *Please Note: Medication refills are assigned a new Rx number when refilled electronically. Your pharmacy may indicate that no refills were authorized even though a new prescription for the same medication is available at the pharmacy. Please request the medicine by name with the pharmacy before contacting your provider for a refill.           Game [employer]isters Access Code: Activation code not generated  Current Game Blisters Status: Active

## 2017-04-27 ENCOUNTER — ROUTINE PRENATAL (OUTPATIENT)
Dept: OBGYN | Facility: CLINIC | Age: 32
End: 2017-04-27
Payer: COMMERCIAL

## 2017-04-27 VITALS — WEIGHT: 293 LBS | SYSTOLIC BLOOD PRESSURE: 124 MMHG | DIASTOLIC BLOOD PRESSURE: 82 MMHG | BODY MASS INDEX: 57.22 KG/M2

## 2017-04-27 DIAGNOSIS — Q33.0 CONGENITAL PULMONARY AIRWAY MALFORMATION (CPAM): ICD-10-CM

## 2017-04-27 DIAGNOSIS — M32.9 SLE WITH NORMAL KIDNEYS (HCC): ICD-10-CM

## 2017-04-27 DIAGNOSIS — O09.93 HIGH-RISK PREGNANCY, THIRD TRIMESTER: ICD-10-CM

## 2017-04-27 LAB
NST ACOUSTIC STIMULATION: NO
NST ACTION NECESSARY: NORMAL
NST ASSESSMENT: REACTIVE
NST BASELINE: 135
NST INDICATIONS: NORMAL
NST OTHER DATA: NORMAL
NST READ BY: NORMAL
NST RETURN: NORMAL
NST UTERINE ACTIVITY: NORMAL

## 2017-04-27 PROCEDURE — 90040 PR PRENATAL FOLLOW UP: CPT | Performed by: OBSTETRICS & GYNECOLOGY

## 2017-04-27 PROCEDURE — 59025 FETAL NON-STRESS TEST: CPT | Performed by: OBSTETRICS & GYNECOLOGY

## 2017-04-27 NOTE — Clinical Note
April 27, 2017       Patient: Little Mitchell   YOB: 1985   Date of Visit: 4/27/2017         To Whom It May Concern:    It is my medical opinion that Little Mitchell remain out of work starting 4/24/2017- 6 weeks after delivery. Other required forms still pending.    If you have any questions or concerns, please don't hesitate to call [phone]          Sincerely,          Rajan Mora M.D.  Electronically Signed

## 2017-04-27 NOTE — PROGRESS NOTES
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 Currently doing well . Good fetal movement no leakage of fluid no contractions or vaginal bleeding        Size equals dates, normal fetal heart rate      Cervix-1 cm dilated/30% effaced/midposition    Labor precautions given  Increase oral hydration    Induction of labor 5/1/17-Dr. Tony confirms she will be available due to CPAM

## 2017-04-27 NOTE — PROGRESS NOTES
Pt here for OB F/V. Good fetal movement. Denies LOF, VB.  Marleni 4/20 in media  GBS NEG  IOL 5/1/17 8:00 am

## 2017-04-27 NOTE — MR AVS SNAPSHOT
[place]   2017 10:30 AM   Routine Prenatal   MRN: [id number]    Department:  [facility]   Dept Phone:  [phone]    Description:  Female : 1985   Provider:  Rajan Mora M.D.           Allergies as of 2017     Allergen Noted Reactions    Asa [Aspirin] 2014       tachycardia      You were diagnosed with     SLE with normal kidneys (CMS-HCC)   [412265]       Congenital pulmonary airway malformation (CPAM)   [0995250]       High-risk pregnancy, third trimester   [518388]         Vital Signs     Blood Pressure Weight Last Menstrual Period Smoking Status          124/82 mmHg 132.904 kg (293 lb) 2016 Never Smoker         Basic Information     Date Of Birth Sex Race Ethnicity Preferred Language    1985 Female White Non- English      Your appointments     May 01, 2017  8:00 AM   [facility] INDUCTION OF LABOR with NON-SURGICAL L&D   LABOR & DELIVERY [facility] (--)    [address] [place] [address] [phone]            May 03, 2017 10:00 AM   OB Follow Up with Rajan Mora M.D.   [facility] ([address])    [address] [place] [address], [address]  [place] [id number] [phone]              Problem List              ICD-10-CM Priority Class Noted - Resolved    High-risk pregnancy O09.90   11/3/2016 - Present    Morbid obesity due to excess calories (CMS-HCC) E66.01   11/3/2016 - Present    SLE with normal kidneys (CMS-HCC) M32.9   2016 - Present    Congenital pulmonary airway malformation (CPAM) Q33.0   2017 - Present      Health Maintenance        Date Due Completion Dates    PAP SMEAR 2017    IMM DTaP/Tdap/Td Vaccine (4 - Td) 2021, 10/17/2007, 2003            Current Immunizations     Hepatitis B Vaccine Non-Recombivax (Ped/Adol) 2000, 2000, 1999    Influenza Vaccine Quad Inj (Preserved) 1/3/2013, 2011    MMR Vaccine 10/1/2009, 1988    TD Vaccine 2003    Tdap Vaccine 5/23/2011, 10/17/2007    Tuberculin Skin Test 12/10/2015      Below and/or attached are the medications your provider expects you to take. Review all of your home medications and newly ordered medications with your provider and/or pharmacist. Follow medication instructions as directed by your provider and/or pharmacist. Please keep your medication list with you and share with your provider. Update the information when medications are discontinued, doses are changed, or new medications (including over-the-counter products) are added; and carry medication information at all times in the event of emergency situations     Allergies:  ASA - (reactions not documented)               Medications  Valid as of: April 27, 2017 - 11:30 AM    Generic Name Brand Name Tablet Size Instructions for use    Cyclobenzaprine HCl (Tab) FLEXERIL 10 MG Take 1 Tab by mouth 3 times a day as needed for Mild Pain.        Cyclobenzaprine HCl (Tab) FLEXERIL 10 MG Take 1 Tab by mouth 3 times a day as needed for Mild Pain.        Hydrocodone-Acetaminophen (Tab) NORCO 5-325 MG Take 1-2 Tabs by mouth every four hours as needed.        MethylPREDNISolone (Tab) MEDROL 4 MG 6 DAY TAPER        Naproxen (Tab) NAPROSYN 500 MG Take 1 Tab by mouth 2 times a day, with meals.        .                 Medicines prescribed today were sent to:     [id number], [place] - [address] [place] [address]    Phone: [phone] Fax: [phone]    Open 24 Hours?: No      Medication refill instructions:       If your prescription bottle indicates you have medication refills left, it is not necessary to call your provider’s office. Please contact your pharmacy and they will refill your medication.    If your prescription bottle indicates you do not have any refills left, you may request refills at any time through one of the following ways: The online [employer] system (except Urgent Care), by  calling your provider’s office, or by asking your pharmacy to contact your provider’s office with a refill request. Medication refills are processed only during regular business hours and may not be available until the next business day. Your provider may request additional information or to have a follow-up visit with you prior to refilling your medication.   *Please Note: Medication refills are assigned a new Rx number when refilled electronically. Your pharmacy may indicate that no refills were authorized even though a new prescription for the same medication is available at the pharmacy. Please request the medicine by name with the pharmacy before contacting your provider for a refill.           [employer] Access Code: Activation code not generated  Current VirnetX Status: Active

## 2017-05-01 ENCOUNTER — HOSPITAL ENCOUNTER (INPATIENT)
Facility: MEDICAL CENTER | Age: 32
LOS: 3 days | End: 2017-05-04
Attending: OBSTETRICS & GYNECOLOGY | Admitting: OBSTETRICS & GYNECOLOGY
Payer: COMMERCIAL

## 2017-05-01 LAB
BASOPHILS # BLD AUTO: 0.2 % (ref 0–1.8)
BASOPHILS # BLD: 0.02 K/UL (ref 0–0.12)
EOSINOPHIL # BLD AUTO: 0.06 K/UL (ref 0–0.51)
EOSINOPHIL NFR BLD: 0.7 % (ref 0–6.9)
ERYTHROCYTE [DISTWIDTH] IN BLOOD BY AUTOMATED COUNT: 42.1 FL (ref 35.9–50)
HCT VFR BLD AUTO: 35.4 % (ref 37–47)
HGB BLD-MCNC: 12.1 G/DL (ref 12–16)
HOLDING TUBE BB 8507: NORMAL
IMM GRANULOCYTES # BLD AUTO: 0.05 K/UL (ref 0–0.11)
IMM GRANULOCYTES NFR BLD AUTO: 0.6 % (ref 0–0.9)
LYMPHOCYTES # BLD AUTO: 1.57 K/UL (ref 1–4.8)
LYMPHOCYTES NFR BLD: 18.6 % (ref 22–41)
MCH RBC QN AUTO: 28.9 PG (ref 27–33)
MCHC RBC AUTO-ENTMCNC: 34.2 G/DL (ref 33.6–35)
MCV RBC AUTO: 84.5 FL (ref 81.4–97.8)
MONOCYTES # BLD AUTO: 0.52 K/UL (ref 0–0.85)
MONOCYTES NFR BLD AUTO: 6.2 % (ref 0–13.4)
NEUTROPHILS # BLD AUTO: 6.23 K/UL (ref 2–7.15)
NEUTROPHILS NFR BLD: 73.7 % (ref 44–72)
NRBC # BLD AUTO: 0 K/UL
NRBC BLD AUTO-RTO: 0 /100 WBC
PLATELET # BLD AUTO: 171 K/UL (ref 164–446)
PMV BLD AUTO: 10.1 FL (ref 9–12.9)
RBC # BLD AUTO: 4.19 M/UL (ref 4.2–5.4)
WBC # BLD AUTO: 8.5 K/UL (ref 4.8–10.8)

## 2017-05-01 PROCEDURE — 700111 HCHG RX REV CODE 636 W/ 250 OVERRIDE (IP)

## 2017-05-01 PROCEDURE — 700101 HCHG RX REV CODE 250

## 2017-05-01 PROCEDURE — 700111 HCHG RX REV CODE 636 W/ 250 OVERRIDE (IP): Performed by: OBSTETRICS & GYNECOLOGY

## 2017-05-01 PROCEDURE — 700101 HCHG RX REV CODE 250: Performed by: OBSTETRICS & GYNECOLOGY

## 2017-05-01 PROCEDURE — 85025 COMPLETE CBC W/AUTO DIFF WBC: CPT

## 2017-05-01 PROCEDURE — A9270 NON-COVERED ITEM OR SERVICE: HCPCS | Performed by: OBSTETRICS & GYNECOLOGY

## 2017-05-01 PROCEDURE — 770002 HCHG ROOM/CARE - OB PRIVATE (112)

## 2017-05-01 PROCEDURE — 700102 HCHG RX REV CODE 250 W/ 637 OVERRIDE(OP): Performed by: OBSTETRICS & GYNECOLOGY

## 2017-05-01 RX ORDER — ACETAMINOPHEN 325 MG/1
650 TABLET ORAL EVERY 6 HOURS PRN
Status: DISCONTINUED | OUTPATIENT
Start: 2017-05-01 | End: 2017-05-04 | Stop reason: HOSPADM

## 2017-05-01 RX ORDER — METHYLERGONOVINE MALEATE 0.2 MG/ML
0.2 INJECTION INTRAVENOUS
Status: DISCONTINUED | OUTPATIENT
Start: 2017-05-01 | End: 2017-05-02 | Stop reason: HOSPADM

## 2017-05-01 RX ORDER — CALCIUM CARBONATE 500 MG/1
500 TABLET, CHEWABLE ORAL PRN
Status: DISCONTINUED | OUTPATIENT
Start: 2017-05-01 | End: 2017-05-04 | Stop reason: HOSPADM

## 2017-05-01 RX ORDER — ALUMINA, MAGNESIA, AND SIMETHICONE 2400; 2400; 240 MG/30ML; MG/30ML; MG/30ML
30 SUSPENSION ORAL EVERY 6 HOURS PRN
Status: DISCONTINUED | OUTPATIENT
Start: 2017-05-01 | End: 2017-05-02 | Stop reason: HOSPADM

## 2017-05-01 RX ORDER — SODIUM CHLORIDE, SODIUM LACTATE, POTASSIUM CHLORIDE, CALCIUM CHLORIDE 600; 310; 30; 20 MG/100ML; MG/100ML; MG/100ML; MG/100ML
INJECTION, SOLUTION INTRAVENOUS
Status: ACTIVE
Start: 2017-05-01 | End: 2017-05-02

## 2017-05-01 RX ORDER — ROPIVACAINE HYDROCHLORIDE 2 MG/ML
INJECTION, SOLUTION EPIDURAL; INFILTRATION; PERINEURAL
Status: COMPLETED
Start: 2017-05-01 | End: 2017-05-01

## 2017-05-01 RX ORDER — SODIUM CHLORIDE, SODIUM LACTATE, POTASSIUM CHLORIDE, CALCIUM CHLORIDE 600; 310; 30; 20 MG/100ML; MG/100ML; MG/100ML; MG/100ML
INJECTION, SOLUTION INTRAVENOUS
Status: ACTIVE
Start: 2017-05-01 | End: 2017-05-01

## 2017-05-01 RX ORDER — SODIUM CHLORIDE, SODIUM LACTATE, POTASSIUM CHLORIDE, CALCIUM CHLORIDE 600; 310; 30; 20 MG/100ML; MG/100ML; MG/100ML; MG/100ML
INJECTION, SOLUTION INTRAVENOUS CONTINUOUS
Status: DISPENSED | OUTPATIENT
Start: 2017-05-01 | End: 2017-05-01

## 2017-05-01 RX ORDER — MISOPROSTOL 200 UG/1
800 TABLET ORAL
Status: DISCONTINUED | OUTPATIENT
Start: 2017-05-01 | End: 2017-05-02 | Stop reason: HOSPADM

## 2017-05-01 RX ORDER — SODIUM CHLORIDE, SODIUM LACTATE, POTASSIUM CHLORIDE, CALCIUM CHLORIDE 600; 310; 30; 20 MG/100ML; MG/100ML; MG/100ML; MG/100ML
INJECTION, SOLUTION INTRAVENOUS CONTINUOUS
Status: DISCONTINUED | OUTPATIENT
Start: 2017-05-01 | End: 2017-05-04 | Stop reason: HOSPADM

## 2017-05-01 RX ADMIN — SODIUM CITRATE AND CITRIC ACID MONOHYDRATE 30 ML: 500; 334 SOLUTION ORAL at 21:47

## 2017-05-01 RX ADMIN — SODIUM CHLORIDE, POTASSIUM CHLORIDE, SODIUM LACTATE AND CALCIUM CHLORIDE 125 ML: 600; 310; 30; 20 INJECTION, SOLUTION INTRAVENOUS at 08:16

## 2017-05-01 RX ADMIN — Medication 1 MILLI-UNITS/MIN: at 17:14

## 2017-05-01 RX ADMIN — ACETAMINOPHEN 650 MG: 325 TABLET, FILM COATED ORAL at 11:59

## 2017-05-01 RX ADMIN — ROPIVACAINE HYDROCHLORIDE 200 MG: 2 INJECTION, SOLUTION EPIDURAL; INFILTRATION at 20:38

## 2017-05-01 RX ADMIN — SODIUM CHLORIDE, POTASSIUM CHLORIDE, SODIUM LACTATE AND CALCIUM CHLORIDE: 600; 310; 30; 20 INJECTION, SOLUTION INTRAVENOUS at 20:59

## 2017-05-01 RX ADMIN — ANTACID TABLETS 500 MG: 500 TABLET, CHEWABLE ORAL at 18:35

## 2017-05-01 RX ADMIN — SODIUM CHLORIDE, POTASSIUM CHLORIDE, SODIUM LACTATE AND CALCIUM CHLORIDE: 600; 310; 30; 20 INJECTION, SOLUTION INTRAVENOUS at 19:45

## 2017-05-01 RX ADMIN — ANTACID TABLETS 500 MG: 500 TABLET, CHEWABLE ORAL at 12:00

## 2017-05-01 RX ADMIN — DINOPROSTONE 5 MG: 20 SUPPOSITORY VAGINAL at 09:17

## 2017-05-01 ASSESSMENT — LIFESTYLE VARIABLES
EVER_SMOKED: NEVER
DO YOU DRINK ALCOHOL: NO

## 2017-05-01 NOTE — IP AVS SNAPSHOT
5/4/2017    Little Mitchell  [address] [facility] [address]    Dear [place] Mari:    [facility] wants to ensure your discharge home is safe and you or your loved ones have had all of your questions answered regarding your care after you leave the hospital.    Below is a list of resources and contact information should you have any questions regarding your hospital stay, follow-up instructions, or active medical symptoms.    Questions or Concerns Regarding… Contact   Medical Questions Related to Your Discharge  (7 days a week, 8am-5pm) Contact a Nurse Care Coordinator   [phone]   Medical Questions Not Related to Your Discharge  (24 hours a day / 7 days a week)  Contact the Nurse Health Line   [phone]    Medications or Discharge Instructions Refer to your discharge packet   or contact your [facility] Primary Care Provider   [phone]   Follow-up Appointment(s) Schedule your appointment via Frontback   or contact Scheduling [phone]   Billing Review your statement via Frontback  or contact Billing [phone]   Medical Records Review your records via Frontback   or contact Medical Records [phone]     You may receive a telephone call within two days of discharge. This call is to make certain you understand your discharge instructions and have the opportunity to have any questions answered. You can also easily access your medical information, test results and upcoming appointments via the Frontback free online health management tool. You can learn more and sign up at [employer]/Frontback. For assistance setting up your Frontback account, please call [phone].    Once again, we want to ensure your discharge home is safe and that you have a clear understanding of any next steps in your care. If you have any questions or concerns, please do not hesitate to contact us, we are here for you. Thank you for choosing [facility] for your healthcare needs.    Sincerely,    Your [facility] Healthcare Team

## 2017-05-01 NOTE — IP AVS SNAPSHOT
Kudan Access Code: Activation code not generated  Current Kudan Status: Active    Keaton Rowhart  A secure, online tool to manage your health information     [employer]’s Kudan® is a secure, online tool that connects you to your personalized health information from the privacy of your home -- day or night - making it very easy for you to manage your healthcare. Once the activation process is completed, you can even access your medical information using the Kudan silvano, which is available for free in the Apple Silvano store or Google Play store.     Kudan provides the following levels of access (as shown below):   My Chart Features   [facility] Primary Care Doctor [facility]  Specialists [facility]  Urgent  Care Non-[facility]  Primary Care  Doctor   Email your healthcare team securely and privately 24/7 X X X X   Manage appointments: schedule your next appointment; view details of past/upcoming appointments X      Request prescription refills. X      View recent personal medical records, including lab and immunizations X X X X   View health record, including health history, allergies, medications X X X X   Read reports about your outpatient visits, procedures, consult and ER notes X X X X   See your discharge summary, which is a recap of your hospital and/or ER visit that includes your diagnosis, lab results, and care plan. X X       How to register for Kudan:  1. Go to  [email or web address].  2. Click on the Sign Up Now box, which takes you to the New Member Sign Up page. You will need to provide the following information:  a. Enter your Kudan Access Code exactly as it appears at the top of this page. (You will not need to use this code after you’ve completed the sign-up process. If you do not sign up before the expiration date, you must request a new code.)   b. Enter your date of birth.   c. Enter your home email address.   d. Click Submit, and follow the next screen’s instructions.  3. Create a Kudan ID. This will  be your [employer] login ID and cannot be changed, so think of one that is secure and easy to remember.  4. Create a [employer] password. You can change your password at any time.  5. Enter your Password Reset Question and Answer. This can be used at a later time if you forget your password.   6. Enter your e-mail address. This allows you to receive e-mail notifications when new information is available in [employer].  7. Click Sign Up. You can now view your health information.    For assistance activating your [employer] account, call [phone]

## 2017-05-01 NOTE — IP AVS SNAPSHOT
Home Care Instructions                                                                                                                [place]   MRN: [id number]    Department:  POST PARTUM 31   2017           Follow-up Information     1. Follow up with Rajan Mora M.D. In 5 weeks.    Specialty:  OB/Gyn    Why:  Hospital follow-up/OB check    Contact information    [address]  [place] [address]  [phone]         I assume responsibility for securing a follow-up  Screening blood test on my baby within the specified date range.    -                  Discharge Instructions       POSTPARTUM DISCHARGE INSTRUCTIONS FOR MOM    YOB: 1985   Age: 31 y.o.               Admit Date: 2017     Discharge Date: 2017  Attending Doctor:  Rajan Mora M.D.                  Allergies:  Asa    Discharged to home by car. Discharged via wheelchair, hospital escort: Yes.  Special equipment needed: Not Applicable  Belongings with: Personal  Be sure to schedule a follow-up appointment with your primary care doctor or any specialists as instructed.     Discharge Plan:   Diet Plan: Discussed  Activity Level: Discussed  Confirmed Follow up Appointment: Appointment Scheduled  Confirmed Symptoms Management: Discussed  Medication Reconciliation Updated: Yes  Pneumococcal Vaccine Given - only chart on this line when given: Given (See MAR)  Influenza Vaccine Indication: Not indicated: Previously immunized this influenza season and > 8 years of age    REASONS TO CALL YOUR OBSTETRICIAN:  1.   Persistent fever or shaking chills (Temperature higher than 100.4)  2.   Heavy bleeding (soaking more than 1 pad per hour); Passing clots  3.   Foul odor from vagina  4.   Mastitis (Breast infection; breast pain, chills, fever, redness)  5.   Urinary pain, burning or frequency  6.   Episiotomy infection  7.   Abdominal incision infection  8.   Severe depression longer than 24 hours    HAND  "WASHING  · Prior to handling the baby.  · Before breastfeeding or bottle feeding baby.  · After using the bathroom or changing the baby's diaper.    WOUND CARE  Ask your physician for additional care instructions.  In general:    ·  Incision:      · Keep clean and dry.    · Do NOT lift anything heavier than your baby for up to 6 weeks.    · There should not be any opening or pus.      VAGINAL CARE  · Nothing inside vagina for 6 weeks: no sexual intercourse, tampons or douching.  · Bleeding may continue for 2-4 weeks.  Amount may vary.    · Call your physician for heavy bleeding which means soaking more than 1 pad per hour    BIRTH CONTROL  · It is possible to become pregnant at any time after delivery and while breastfeeding.  · Plan to discuss a method of birth control with your physician at your follow up visit. visit.    DIET AND ELIMINATION  · Eating more fiber (bran cereal, fruits, and vegetables) and drinking plenty of fluids will help to avoid constipation.  · Urinary frequency after childbirth is normal.    POSTPARTUM BLUES  During the first few days after birth, you may experience a sense of the \"blues\" which may include impatience, irritability or even crying.  These feeling come and go quickly.  However, as many as 1 in 10 women experience emotional symptoms known as postpartum depression.    Postpartum depression:  May start as early as the second or third day after delivery or take several weeks or months to develop.  Symptoms of \"blues\" are present, but are more intense:  Crying spells; loss of appetite; feelings of hopelessness or loss of control; fear of touching the baby; over concern or no concern at all about the baby; little or no concern about your own appearance/caring for yourself; and/or inability to sleep or excessive sleeping.  Contact your physician if you are experiencing any of these symptoms.    Crisis Hotline:  · National Crisis Hotline:  6-495-SBQPRWV  Or " "1-649.534.1581  · [place] Crisis Hotline:  1-268.284.4590  Or 439-132-0009    PREVENTING SHAKEN BABY:  If you are angry or stressed, PUT THE BABY IN THE CRIB, step away, take some deep breaths, and wait until you are calm to care for the baby.  DO NOT SHAKE THE BABY.  You are not alone, call a supporter for help.    · Crisis Call Center 24/7 crisis line 386-562-4435 or 1-725.897.3296  · You can also text them, text \"ANSWER\" to 815400    QUIT SMOKING/TOBACCO USE:  I understand the use of any tobacco products increases my chance of suffering from future heart disease and could cause other illnesses which may shorten my life. Quitting the use of tobacco products is the single most important thing I can do to improve my health. For further information on smoking / tobacco cessation call a Toll Free Quit Line at 1-611.848.1321 (*National Cancer [place]) or 1-460.803.6854 (American Lung Association) or you can access the web based program at www.lungusa.org.    · [place] Tobacco Users Help Line:  (489) 978-9657       Toll Free: 1-147.333.3406  · Quit Tobacco Program [facility] (494)606-4624    DEPRESSION / SUICIDE RISK:  As you are discharged from this [facility] facility, it is important to learn how to keep safe from harming yourself.    Recognize the warning signs:  · Abrupt changes in personality, positive or negative- including increase in energy   · Giving away possessions  · Change in eating patterns- significant weight changes-  positive or negative  · Change in sleeping patterns- unable to sleep or sleeping all the time   · Unwillingness or inability to communicate  · Depression  · Unusual sadness, discouragement and loneliness  · Talk of wanting to die  · Neglect of personal appearance   · Rebelliousness- reckless behavior  · Withdrawal from people/activities they love  · Confusion- inability to concentrate     If you or a loved one observes any of these behaviors or has concerns about " self-harm, here's what you can do:  · Talk about it- your feelings and reasons for harming yourself  · Remove any means that you might use to hurt yourself (examples: pills, rope, extension cords, firearm)  · Get professional help from the community (Mental Health, Substance Abuse, psychological counseling)  · Do not be alone:Call your Safe Contact- someone whom you trust who will be there for you.  · Call your local CRISIS HOTLINE 843-4950 or 475-548-7502  · Call your local Children's Mobile Crisis Response Team [place] (740) 737-1342 or www.[employer]  · Call the toll free National Suicide Prevention Hotlines   · National Suicide Prevention Lifeline 564-782-SNAC (6679)  · National Hope Line Network 800-SUICIDE (474-6175)    DISCHARGE SURVEY:  Thank you for choosing [facility].  We hope we provided you with very good care.  You may be receiving a survey in the mail.  Please fill it out.  Your opinion is valuable to us.    ADDITIONAL EDUCATIONAL MATERIALS GIVEN TO PATIENT:        My signature on this form indicates that:  1.  I have reviewed and understand the above information  2.  My questions regarding this information have been answered to my satisfaction.  3.  I have formulated a plan with my discharge nurse to obtain my prescribed medication for home.         Discharge Medication Instructions:    Below are the medications your physician expects you to take upon discharge:    Review all your home medications and newly ordered medications with your doctor and/or pharmacist. Follow medication instructions as directed by your doctor and/or pharmacist.    Please keep your medication list with you and share with your physician.               Medication List      START taking these medications        Instructions    Morning Afternoon Evening Bedtime     MG Caps   Last time this was given:  100 mg on 5/3/2017  1:37 PM        Take 100 mg by mouth 2 times a day as needed for Constipation.   Dose:   100 mg                        famotidine 20 MG Tabs   Commonly known as:  PEPCID        Take 1 Tab by mouth 2 Times a Day.   Dose:  20 mg                        ibuprofen 600 MG Tabs   Last time this was given:  600 mg on 5/3/2017  8:33 PM   Commonly known as:  MOTRIN        Take 1 Tab by mouth every 6 hours as needed for Mild Pain.   Dose:  600 mg                          CONTINUE taking these medications        Instructions    Morning Afternoon Evening Bedtime    hydrocodone-acetaminophen 5-325 MG Tabs per tablet   Commonly known as:  NORCO        Take 1-2 Tabs by mouth every four hours as needed.   Dose:  1-2 Tab                          STOP taking these medications     cyclobenzaprine 10 MG Tabs   Commonly known as:  FLEXERIL               methylPREDNISolone 4 MG Tabs   Commonly known as:  MEDROL               naproxen 500 MG Tabs   Commonly known as:  NAPROSYN                    Where to Get Your Medications      Information about where to get these medications is not yet available     ! Ask your nurse or doctor about these medications    -  MG Caps  - famotidine 20 MG Tabs  - hydrocodone-acetaminophen 5-325 MG Tabs per tablet  - ibuprofen 600 MG Tabs            Crisis Hotline:     [place] Crisis Hotline:  2-849-CERNVCY or 1-195.543.3483    [place] Crisis Hotline:    1-251.210.3262 or 116-552-3805        Disclaimer           _____________________________________                     __________       ________       Patient/Mother Signature or Legal                          Date                   Time

## 2017-05-01 NOTE — PROGRESS NOTES
Patient came in for ob check.EFM applied and POC.Here for IOL for congenital pulmonary malformation.0910 updated by Dr mora.0917 prostin gel inserted.1100 up to ambulate in [place].Dr Mora called to talk to patient about POC.1100 Dr Mora here to talk to patient.1200 medicated for headache and heartburn.patient is disappointed that she isn't in active labor already-this isn't like her other labors.It was explained to her why Dr Mora ordered prostin gel for induction.ICN informed of patients status.

## 2017-05-01 NOTE — H&P
History and Physical;      Little Mitchell is a 31 y.o. year old female  at 37w3d who presents for IOL-  Due to CPAM     Subjective:   negative  For CTXS.   negative Feels pain   negative for LOF  negative for vaginal bleeding.   positive for fetal movement    ROS: A comprehensive review of systems was negative.    Past Medical History   Diagnosis Date   • Hypertension      No past surgical history on file.  OB History    Para Term  AB SAB TAB Ectopic Multiple Living   5 3 3  1 1    3      # Outcome Date GA Lbr Dino/2nd Weight Sex Delivery Anes PTL Lv   5 Current            4 Term     F Vag-Spont   Y      Comments: System Generated. Please review and update pregnancy details.   3 SAB            2 Term     F Vag-Spont   Y   1 Term     F Vag-Spont   Y        Social History     Social History   • Marital Status: Single     Spouse Name: N/A   • Number of Children: N/A   • Years of Education: N/A     Occupational History   • Not on file.     Social History Main Topics   • Smoking status: Never Smoker    • Smokeless tobacco: Not on file      Comment: denies   • Alcohol Use: No      Comment: denies   • Drug Use: No      Comment: denies   • Sexual Activity:     Partners: Male     Other Topics Concern   • Not on file     Social History Narrative     Allergies: Asa    Current facility-administered medications:   •  lactated ringers infusion, , Intravenous, Continuous, Rajan Mora M.D., Last Rate: 125 mL/hr at 17 0816, 125 mL at 17 0816  •  LACTATED RINGERS IV SOLN, , , ,   •  dinoprostone gel (PROSTAGLANDIN) 5 mg, 5 mg, Vaginal, Once, Rajan Mora M.D.  •  LR infusion, , Intravenous, Continuous, Rajan Mora M.D.  •  fentaNYL (SUBLIMAZE) injection 50 mcg, 50 mcg, Intravenous, Q HOUR PRN, Rajan Mora M.D.  •  fentaNYL (SUBLIMAZE) injection 100 mcg, 100 mcg, Intravenous, Q HOUR PRN, Rajan Mora M.D.  •  mag hydrox-al hydrox-simeth (MAALOX PLUS ES or MYLANTA DS) suspension  "30 mL, 30 mL, Oral, Q6HRS PRN, Rajan Mora M.D.  •  oxytocin (PITOCIN) infusion (for induction), 0.5-20 aneta-units/min, Intravenous, Continuous, Rajan Mora M.D.  •  misoprostol (CYTOTEC) tablet 800 mcg, 800 mcg, Rectal, Once PRN, Rajan Mora M.D.  •  methylergonovine (METHERGINE) injection 0.2 mg, 0.2 mg, Intramuscular, Once PRN, Rajan Mora M.D.    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    General:   normal, no acute distress   Skin:   normal   HEENT:  PERRLA   Lungs:   CTA bilateral   Heart:   brisk carotid upstroke without bruits, peripheral pulses very brisk, chest is clear without rales or wheezing, no pedal edema, no JVD, no hepatosplenomegaly   Abdomen:   gravid, NT   EFW:  3200 g   Pelvis:  Vulva and vagina appear normal. Bimanual exam reveals normal uterus and adnexa., proven to 3500 g   FHTs: 145 BPM good accels no decels good variability   Contractions: 0 contractions in 10 min soft to palpation   Uterine Size: S=D   Presentations: Cephalic per Abby   Cervix: Per abby    Dilation: 1cm    Effacement: 25%    Station:  -1    Consistency: Medium    Position: Posterior     Complete OB US  See labs    Lab Review  Lab:   Blood type: A     Recent Results (from the past 5880 hour(s))   CBC WITHOUT DIFFERENTIAL    Collection Time: 10/15/16  2:44 PM   Result Value Ref Range    WBC 9.9 4.8 - 10.8 K/uL    RBC 5.09 4.20 - 5.40 M/uL    Hemoglobin 14.2 12.0 - 16.0 g/dL    Hematocrit 42.9 37.0 - 47.0 %    MCV 84.3 81.4 - 97.8 fL    " MCH 27.9 27.0 - 33.0 pg    MCHC 33.1 (L) 33.6 - 35.0 g/dL    RDW 42.4 35.9 - 50.0 fL    Platelet Count 273 164 - 446 K/uL    MPV 9.3 9.0 - 12.9 fL   RH TYPE FOR RHOGAM FROM E.D.    Collection Time: 10/15/16  2:44 PM   Result Value Ref Range    Emergency Department Rh Typing NEG     Number Of Rh Doses Indicated 1    HCG QUANTITATIVE SERUM    Collection Time: 10/15/16  2:44 PM   Result Value Ref Range    Bhcg 80143.6 (H) 0.0 - 5.0 mIU/mL   ACTION: RH IMMUNE GLOBULIN    Collection Time: 10/15/16  2:44 PM   Result Value Ref Range    Action  Rh Immune Globulin [id number]         issued       1 Syrng    PRENATAL PANEL 3+HIV+UACXI    Collection Time: 16 11:14 AM   Result Value Ref Range    WBC 9.4 4.8 - 10.8 K/uL    RBC 4.90 4.20 - 5.40 M/uL    Hemoglobin 13.9 12.0 - 16.0 g/dL    Hematocrit 41.7 37.0 - 47.0 %    MCV 85.1 81.4 - 97.8 fL    MCH 28.4 27.0 - 33.0 pg    MCHC 33.3 (L) 33.6 - 35.0 g/dL    RDW 42.9 35.9 - 50.0 fL    Platelet Count 234 164 - 446 K/uL    MPV 10.2 9.0 - 12.9 fL    Neutrophils-Polys 73.40 (H) 44.00 - 72.00 %    Lymphocytes 19.60 (L) 22.00 - 41.00 %    Monocytes 5.60 0.00 - 13.40 %    Eosinophils 0.50 0.00 - 6.90 %    Basophils 0.50 0.00 - 1.80 %    Immature Granulocytes 0.40 0.00 - 0.90 %    Nucleated RBC 0.00 /100 WBC    Neutrophils (Absolute) 6.86 2.00 - 7.15 K/uL    Lymphs (Absolute) 1.83 1.00 - 4.80 K/uL    Monos (Absolute) 0.52 0.00 - 0.85 K/uL    Eos (Absolute) 0.05 0.00 - 0.51 K/uL    Baso (Absolute) 0.05 0.00 - 0.12 K/uL    Immature Granulocytes (abs) 0.04 0.00 - 0.11 K/uL    NRBC (Absolute) 0.00 K/uL    Micro Urine Req Microscopic     Color Yellow     Character Sl Cloudy (A)     Specific Gravity 1.016 <1.035    Ph 6.0 5.0-8.0    Glucose Negative Negative mg/dL    Ketones Negative Negative mg/dL    Protein Negative Negative mg/dL    Bilirubin Negative Negative    Nitrite Negative Negative    Leukocyte Esterase Negative Negative    Occult Blood Negative Negative    Culture Indicated Yes UA  Culture    Rubella IgG Antibody 64.50 IU/mL    Syphilis, Treponemal Qual Non Reactive Non Reactive    Hepatitis B Surface Antigen Negative Negative   HIV ANTIBODIES    Collection Time: 11/18/16 11:14 AM   Result Value Ref Range    HIV Ag/Ab Combo Assay Non Reactive Non Reactive   OP PRENATAL PANEL-BLOOD BANK    Collection Time: 11/18/16 11:14 AM   Result Value Ref Range    ABO Grouping Only A     Rh Grouping Only NEG     Antibody Screen Scrn POS    URINE MICROSCOPIC (W/UA)    Collection Time: 11/18/16 11:14 AM   Result Value Ref Range    WBC 0-2 /hpf    RBC 0-2 /hpf    Bacteria Few (A) None /hpf    Epithelial Cells Few /hpf    Mucous Threads Few /hpf    Hyaline Cast 0-2 /lpf   URINE CULTURE(NEW)    Collection Time: 11/18/16 11:14 AM   Result Value Ref Range    Significant Indicator NEG     Source UR     Urine Culture Mixed skin yordy ,000 cfu/mL    ANTIBODY IDENTIFICATION    Collection Time: 11/18/16 11:14 AM   Result Value Ref Range    Antibody Id POS, anti-D due to RHIG injection    SEQUENTIAL 1    Collection Time: 11/18/16 11:15 AM   Result Value Ref Range    Results Report     Test Results SEE BELOW     Submit Part 2 Sample Using: Date:     Crown Rump Length 68.5 mm    CRL Scan Date Date:     Sonographer ID: [id number]     Gest. Age on Collection Date 13.1 weeks    Maternal Age At JAUN 29.9 years    Race Other     Weight 1st 319 lbs    Number of Fetuses 1     Nuchal Translucency 1.3 mm    NT MoM 0.75     Hcg Value 50.8 IU/mL    Hcg Mom 0.94     GASTON-A Value 303.5 ng/mL    GASTON-A MoM 0.64     Down Syndrome Screening Risk:     Dsr By Age Age Risk:     Trisomy 18 Screening Risk:     T18 By Age Age Risk:     Interpretation SEE BELOW     T18 Interpretation SEE BELOW     Comment: SEE BELOW     Note: SEE BELOW    SEQUENTIAL 2    Collection Time: 12/01/16 10:51 AM   Result Value Ref Range    Results Report     Results SEE BELOW     Crown Rump Length 68.5 mm    CRL Scan Date Date:     Sonographer ID: [id number]     Collected  On (1st) Date:     Gest. Age on Collection Date 13.1 weeks    Weight 1st 319 lbs    Collected On (2nd) Date:     Gestational Age 15.0 weeks    Weight (2nd) 225 lbs    Maternal Age At JUAN 29.9 years    Race Other     Insulin Dependent Diabetes No     Number of Fetuses 1     Nuchal Translucency 1.3 mm    NT MoM 0.75     GASTON-A Value 303.5 ng/mL    GASTON-A MoM 0.64     AFP Value -Eia 24.4 ng/mL    AFP MOM Value 1.28     Hcg Value 31.9 IU/mL    Hcg Mom 0.87     Ue3 Value 0.39 ng/mL    Ue3 Mom 0.64     Ania Value -Eia 134.5 pg/mL    Ania Mom Value 0.90     Open Spina Bifida Screening Risk:     Down Syndrome Screening Risk:     Dsr By Age Age Risk:     Trisomy 18 Screening Risk:     T18 By Age Age Risk:     OSB Interpretation SEE BELOW     Interpretation SEE BELOW     T18 Interpretation SEE BELOW     Comment: SEE BELOW     Note: SEE BELOW    AMNISURE ROM ASSAY    Collection Time: 03/12/17 12:00 AM   Result Value Ref Range    AmniSure ROM Negative Negative   FETAL FIBRONECTIN    Collection Time: 03/12/17 11:00 PM   Result Value Ref Range    Fetal Fibronectin Negative    FETAL FIBRONECTIN    Collection Time: 04/03/17  5:10 PM   Result Value Ref Range    Fetal Fibronectin Negative    POC UA    Collection Time: 04/03/17  6:26 PM   Result Value Ref Range    POC Color Yellow     POC Appearance Clear     POC Glucose Negative Negative mg/dL    POC Ketones Negative Negative mg/dL    POC Specific Gravity 1.010 1.005-1.030    POC Blood Negative Negative    POC Urine PH 6.0 5.0-8.0    POC Protein Negative Negative mg/dL    POC Nitrites Negative Negative    POC Leukocyte Esterase Trace (A) Negative   Fetal Nonstress Test    Collection Time: 04/18/17 12:10 PM   Result Value Ref Range    NST Indications CPAM     NST Baseline 140     NST Uterine Activity none     NST Acoustic Stimulation yes     NST Assessment reactive     NST Action Necessary none     NST Other Data      NST Return      NST Read By     GRP B STREP, BY PCR (SAUCEDA BROTH)     Collection Time: 04/18/17 10:43 PM   Result Value Ref Range    Strep Gp B DNA PCR Negative Negative   AMBIGUOUS DATE/TIME    Collection Time: 04/18/17 10:43 PM   Result Value Ref Range    Significant Indicator NEG     Source GEN     Site swab     Ambiguous Date/Time       This specimen was received from your office without a  collection date and/or time. Collection date and/or time  defaulted to receipt date and/or time.     POCT Fetal Nonstress Test    Collection Time: 04/21/17 11:25 AM   Result Value Ref Range    NST Indications Lupus,CPAM     NST Baseline 140     NST Uterine Activity none     NST Acoustic Stimulation yes     NST Assessment nonreactive     NST Action Necessary needs biophysical profile     NST Other Data      NST Return      NST Read By     POCT Fetal Nonstress Test    Collection Time: 04/25/17 11:52 AM   Result Value Ref Range    NST Indications Lupus     NST Baseline 140s     NST Uterine Activity none     NST Acoustic Stimulation      NST Assessment Cat 1     NST Action Necessary      NST Other Data      NST Return twice weekly     NST Read By Augustin    Fetal Nonstress Test    Collection Time: 04/27/17 11:34 AM   Result Value Ref Range    NST Indications SLE,CPAM     NST Baseline 135     NST Uterine Activity none     NST Acoustic Stimulation no     NST Assessment reactive     NST Action Necessary none     NST Other Data      NST Return      NST Read By     CBC WITH DIFFERENTIAL    Collection Time: 05/01/17  8:29 AM   Result Value Ref Range    WBC 8.5 4.8 - 10.8 K/uL    RBC 4.19 (L) 4.20 - 5.40 M/uL    Hemoglobin 12.1 12.0 - 16.0 g/dL    Hematocrit 35.4 (L) 37.0 - 47.0 %    MCV 84.5 81.4 - 97.8 fL    MCH 28.9 27.0 - 33.0 pg    MCHC 34.2 33.6 - 35.0 g/dL    RDW 42.1 35.9 - 50.0 fL    Platelet Count 171 164 - 446 K/uL    MPV 10.1 9.0 - 12.9 fL    Neutrophils-Polys 73.70 (H) 44.00 - 72.00 %    Lymphocytes 18.60 (L) 22.00 - 41.00 %    Monocytes 6.20 0.00 - 13.40 %    Eosinophils 0.70 0.00 - 6.90 %     Basophils 0.20 0.00 - 1.80 %    Immature Granulocytes 0.60 0.00 - 0.90 %    Nucleated RBC 0.00 /100 WBC    Neutrophils (Absolute) 6.23 2.00 - 7.15 K/uL    Lymphs (Absolute) 1.57 1.00 - 4.80 K/uL    Monos (Absolute) 0.52 0.00 - 0.85 K/uL    Eos (Absolute) 0.06 0.00 - 0.51 K/uL    Baso (Absolute) 0.02 0.00 - 0.12 K/uL    Immature Granulocytes (abs) 0.05 0.00 - 0.11 K/uL    NRBC (Absolute) 0.00 K/uL   HOLD BLOOD BANK SPECIMEN (NOT TESTED)    Collection Time: 05/01/17  8:29 AM   Result Value Ref Range    Holding Tube - Bb DONE         Assessment:   1.  IUP at 37w3d  2.  Labor status: Not in labor.  3.  Cat 1 FHTs  4.  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      Plan:     Admit to L&D  GBS negative  Routine labs  Pain management prn

## 2017-05-02 LAB
ACTION RH IMMUNE GLOB 8505RHG: NORMAL
ERYTHROCYTE [DISTWIDTH] IN BLOOD BY AUTOMATED COUNT: 42.9 FL (ref 35.9–50)
HCT VFR BLD AUTO: 33.7 % (ref 37–47)
HGB BLD-MCNC: 11.3 G/DL (ref 12–16)
IMMUNE ROSETTING TEST 8505FMH: NORMAL
MCH RBC QN AUTO: 28.5 PG (ref 27–33)
MCHC RBC AUTO-ENTMCNC: 33.5 G/DL (ref 33.6–35)
MCV RBC AUTO: 84.9 FL (ref 81.4–97.8)
NUMBER OF RH DOSES IND 8505RD: 1
PLATELET # BLD AUTO: 173 K/UL (ref 164–446)
PMV BLD AUTO: 10.3 FL (ref 9–12.9)
RBC # BLD AUTO: 3.97 M/UL (ref 4.2–5.4)
WBC # BLD AUTO: 10.7 K/UL (ref 4.8–10.8)

## 2017-05-02 PROCEDURE — 90471 IMMUNIZATION ADMIN: CPT

## 2017-05-02 PROCEDURE — 3E033VJ INTRODUCTION OF OTHER HORMONE INTO PERIPHERAL VEIN, PERCUTANEOUS APPROACH: ICD-10-PCS | Performed by: OBSTETRICS & GYNECOLOGY

## 2017-05-02 PROCEDURE — A9270 NON-COVERED ITEM OR SERVICE: HCPCS | Performed by: FAMILY MEDICINE

## 2017-05-02 PROCEDURE — 3E0P7GC INTRODUCTION OF OTHER THERAPEUTIC SUBSTANCE INTO FEMALE REPRODUCTIVE, VIA NATURAL OR ARTIFICIAL OPENING: ICD-10-PCS | Performed by: OBSTETRICS & GYNECOLOGY

## 2017-05-02 PROCEDURE — 700102 HCHG RX REV CODE 250 W/ 637 OVERRIDE(OP): Performed by: FAMILY MEDICINE

## 2017-05-02 PROCEDURE — 85461 HEMOGLOBIN FETAL: CPT

## 2017-05-02 PROCEDURE — 36415 COLL VENOUS BLD VENIPUNCTURE: CPT

## 2017-05-02 PROCEDURE — A9270 NON-COVERED ITEM OR SERVICE: HCPCS | Performed by: OBSTETRICS & GYNECOLOGY

## 2017-05-02 PROCEDURE — 770002 HCHG ROOM/CARE - OB PRIVATE (112)

## 2017-05-02 PROCEDURE — 700102 HCHG RX REV CODE 250 W/ 637 OVERRIDE(OP): Performed by: OBSTETRICS & GYNECOLOGY

## 2017-05-02 PROCEDURE — 3E0234Z INTRODUCTION OF SERUM, TOXOID AND VACCINE INTO MUSCLE, PERCUTANEOUS APPROACH: ICD-10-PCS | Performed by: OBSTETRICS & GYNECOLOGY

## 2017-05-02 PROCEDURE — 304965 HCHG RECOVERY SERVICES

## 2017-05-02 PROCEDURE — 700111 HCHG RX REV CODE 636 W/ 250 OVERRIDE (IP): Performed by: OBSTETRICS & GYNECOLOGY

## 2017-05-02 PROCEDURE — 59409 OBSTETRICAL CARE: CPT

## 2017-05-02 PROCEDURE — 85027 COMPLETE CBC AUTOMATED: CPT

## 2017-05-02 PROCEDURE — 303615 HCHG EPIDURAL/SPINAL ANESTHESIA FOR LABOR

## 2017-05-02 PROCEDURE — 700112 HCHG RX REV CODE 229: Performed by: OBSTETRICS & GYNECOLOGY

## 2017-05-02 PROCEDURE — 90732 PPSV23 VACC 2 YRS+ SUBQ/IM: CPT | Performed by: OBSTETRICS & GYNECOLOGY

## 2017-05-02 RX ORDER — OXYCODONE AND ACETAMINOPHEN 7.5; 325 MG/1; MG/1
1 TABLET ORAL EVERY 4 HOURS PRN
Status: CANCELLED | OUTPATIENT
Start: 2017-05-02

## 2017-05-02 RX ORDER — SODIUM CHLORIDE, SODIUM LACTATE, POTASSIUM CHLORIDE, CALCIUM CHLORIDE 600; 310; 30; 20 MG/100ML; MG/100ML; MG/100ML; MG/100ML
INJECTION, SOLUTION INTRAVENOUS PRN
Status: DISCONTINUED | OUTPATIENT
Start: 2017-05-02 | End: 2017-05-04 | Stop reason: HOSPADM

## 2017-05-02 RX ORDER — VITAMIN A ACETATE, BETA CAROTENE, ASCORBIC ACID, CHOLECALCIFEROL, .ALPHA.-TOCOPHEROL ACETATE, DL-, THIAMINE MONONITRATE, RIBOFLAVIN, NIACINAMIDE, PYRIDOXINE HYDROCHLORIDE, FOLIC ACID, CYANOCOBALAMIN, CALCIUM CARBONATE, FERROUS FUMARATE, ZINC OXIDE, CUPRIC OXIDE 3080; 12; 120; 400; 1; 1.84; 3; 20; 22; 920; 25; 200; 27; 10; 2 [IU]/1; UG/1; MG/1; [IU]/1; MG/1; MG/1; MG/1; MG/1; MG/1; [IU]/1; MG/1; MG/1; MG/1; MG/1; MG/1
1 TABLET, FILM COATED ORAL EVERY MORNING
Status: DISCONTINUED | OUTPATIENT
Start: 2017-05-02 | End: 2017-05-04 | Stop reason: HOSPADM

## 2017-05-02 RX ORDER — METHYLERGONOVINE MALEATE 0.2 MG/ML
0.2 INJECTION INTRAVENOUS
Status: DISCONTINUED | OUTPATIENT
Start: 2017-05-02 | End: 2017-05-04 | Stop reason: HOSPADM

## 2017-05-02 RX ORDER — BISACODYL 10 MG
10 SUPPOSITORY, RECTAL RECTAL PRN
Status: DISCONTINUED | OUTPATIENT
Start: 2017-05-02 | End: 2017-05-04 | Stop reason: HOSPADM

## 2017-05-02 RX ORDER — ONDANSETRON 2 MG/ML
4 INJECTION INTRAMUSCULAR; INTRAVENOUS EVERY 6 HOURS PRN
Status: DISCONTINUED | OUTPATIENT
Start: 2017-05-02 | End: 2017-05-04 | Stop reason: HOSPADM

## 2017-05-02 RX ORDER — ACETAMINOPHEN 325 MG/1
650 TABLET ORAL EVERY 4 HOURS PRN
Status: CANCELLED | OUTPATIENT
Start: 2017-05-02

## 2017-05-02 RX ORDER — OXYCODONE HYDROCHLORIDE 5 MG/1
5 TABLET ORAL EVERY 4 HOURS PRN
Status: DISCONTINUED | OUTPATIENT
Start: 2017-05-02 | End: 2017-05-04 | Stop reason: HOSPADM

## 2017-05-02 RX ORDER — ACETAMINOPHEN 325 MG/1
325 TABLET ORAL EVERY 4 HOURS PRN
Status: DISCONTINUED | OUTPATIENT
Start: 2017-05-02 | End: 2017-05-04 | Stop reason: HOSPADM

## 2017-05-02 RX ORDER — IBUPROFEN 600 MG/1
600 TABLET ORAL EVERY 6 HOURS PRN
Status: DISCONTINUED | OUTPATIENT
Start: 2017-05-02 | End: 2017-05-04 | Stop reason: HOSPADM

## 2017-05-02 RX ORDER — DOCUSATE SODIUM 100 MG/1
100 CAPSULE, LIQUID FILLED ORAL 2 TIMES DAILY PRN
Status: DISCONTINUED | OUTPATIENT
Start: 2017-05-02 | End: 2017-05-04 | Stop reason: HOSPADM

## 2017-05-02 RX ORDER — OXYCODONE HYDROCHLORIDE 10 MG/1
10 TABLET ORAL EVERY 4 HOURS PRN
Status: DISCONTINUED | OUTPATIENT
Start: 2017-05-02 | End: 2017-05-04 | Stop reason: HOSPADM

## 2017-05-02 RX ORDER — ONDANSETRON 4 MG/1
4 TABLET, ORALLY DISINTEGRATING ORAL EVERY 6 HOURS PRN
Status: DISCONTINUED | OUTPATIENT
Start: 2017-05-02 | End: 2017-05-04 | Stop reason: HOSPADM

## 2017-05-02 RX ORDER — MISOPROSTOL 200 UG/1
600 TABLET ORAL
Status: DISCONTINUED | OUTPATIENT
Start: 2017-05-02 | End: 2017-05-04 | Stop reason: HOSPADM

## 2017-05-02 RX ADMIN — OXYCODONE HYDROCHLORIDE 5 MG: 5 TABLET ORAL at 04:31

## 2017-05-02 RX ADMIN — IBUPROFEN 600 MG: 600 TABLET, FILM COATED ORAL at 12:05

## 2017-05-02 RX ADMIN — IBUPROFEN 600 MG: 600 TABLET, FILM COATED ORAL at 18:24

## 2017-05-02 RX ADMIN — DOCUSATE SODIUM 100 MG: 100 CAPSULE ORAL at 18:24

## 2017-05-02 RX ADMIN — Medication 125 ML/HR: at 04:20

## 2017-05-02 RX ADMIN — Medication 2000 ML/HR: at 03:30

## 2017-05-02 RX ADMIN — OXYCODONE HYDROCHLORIDE 10 MG: 10 TABLET ORAL at 21:22

## 2017-05-02 RX ADMIN — OXYCODONE HYDROCHLORIDE 10 MG: 10 TABLET ORAL at 12:14

## 2017-05-02 RX ADMIN — Medication 1 TABLET: at 08:38

## 2017-05-02 RX ADMIN — PNEUMOCOCCAL VACCINE POLYVALENT 25 MCG
25; 25; 25; 25; 25; 25; 25; 25; 25; 25; 25; 25; 25; 25; 25; 25; 25; 25; 25; 25; 25; 25; 25 INJECTION, SOLUTION INTRAMUSCULAR; SUBCUTANEOUS at 08:38

## 2017-05-02 RX ADMIN — OXYCODONE HYDROCHLORIDE 5 MG: 5 TABLET ORAL at 08:38

## 2017-05-02 ASSESSMENT — PAIN SCALES - GENERAL
PAINLEVEL_OUTOF10: 4
PAINLEVEL_OUTOF10: 8
PAINLEVEL_OUTOF10: 8
PAINLEVEL_OUTOF10: 4
PAINLEVEL_OUTOF10: 4
PAINLEVEL_OUTOF10: 2
PAINLEVEL_OUTOF10: 2
PAINLEVEL_OUTOF10: 8
PAINLEVEL_OUTOF10: 6

## 2017-05-02 NOTE — CONSULTS
Mom at [facility] in nursery.  States baby latched after Labor.  BF one child for 18m, others less due to returning to work. States she will call us if she wants assist with breastfeeding.  Will continue to be available to her.

## 2017-05-02 NOTE — PROGRESS NOTES
- Report received from CONCETTA Ames RN. Pt denies LOF or VB.  Pt reports UC's. Pt requesting cervical exam.  - notified Dr. Mora that pt is requesting cervical exam.  Dr. Mora requesting cervical exam and requesting to have pitocin increased.  Dr. Mora will call Dr. Tony for delivery and NICU will be present per Dr. Mora and Dr. Tony.  - SVE 3/60/-2, DREW Kuo, SERAFIN. Pitocin increased. LR bolus running for epidural placement.  - Dr. MARIA LUISA Alonso at the bedside, timeout complete, epidural placed.  - SVE 3-/-1, Dr. Mora.  - Dr. Mora at the bedside, SVE 3-/-1, AROM, clr, IUPC and FSE placement.  013- pt deferring cervical exam, pt requesting to sleep.  205- SVE /-1, DREW Kuo, SERAFIN.  Dr. Mora updated on labor status.  0320- Dr. Mora, SVE C/+2.  0327- Dr. Mora, , viable male, apgars 7, 9. Baby transported to NBN by Nicu RN's and RT.  0330- Placenta del  0530- pt ambulated to restroom with assistance, voided, pericare, and gown change.  0545- pt transferred to  312, report given to SERAFIN Hinds.

## 2017-05-02 NOTE — PROGRESS NOTES
Assisted latch in football position, non-nutritive sucking, few sucks. Sleepy, uninterested.  Mom requested donor milk but easy to hand express (HE). Removed 3ml with minimal HE, tsp back. Mom requested to pump.  Feels she is engorged although no signs.  Pump set up, settings removed, 91% suction without discomfort, 15ml each side removed. Plan to use 15ml this feeding, offer breast each feeding, top off with pumped milk.  Not necessary to pump at each feeding.

## 2017-05-02 NOTE — CARE PLAN
Problem: Safety  Goal: Free from accidental injury  Intervention: Initiate Safety Measures  Pt encouraged to call for assistance.      Problem: Pain  Goal: Alleviation of Pain or a reduction in pain to the patient’s comfort goal  Intervention: Pain Management - Epidural/Spinal  Epidural placed to maintain pt's comfort goal.

## 2017-05-02 NOTE — PROGRESS NOTES
Report received from SERAFIN Hinds. Pt is AAOx4, no family at bedside. Assessment completed. Fundus firm, lochia light. Pt complaining of pain 8/10 in her abdomen.  Medicated per MAR. Pt ambulates independently. Pt educated regarding plan of care, including NB care, pain management, and emotional support. All questions answered. Call light and personal belongings in reach. No additional needs at this time.

## 2017-05-02 NOTE — PROGRESS NOTES
Cervix- 3-4 cm/60 %effaced/-1 station    Comfortable with epidural    Pitocin at 5 mu/min    Category 2 fhr tracing

## 2017-05-02 NOTE — DELIVERY NOTE
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  The patient was admitted, initially 1 cm dilated, had prostaglandin gel   cervical ripening followed by Pitocin augmentation of labor.  She had   epidural placed, dilated to complete and complete and delivered via normal   spontaneous vaginal delivery.  Apgar scores have yet to be assigned.  Placenta   delivered spontaneously intact with 3-vessel cord, category 1 fetal heart   rate tracing throughout her labor.  ICN staff was present for delivery.    Neonatologists was notified, but not present for delivery.  Dr. Tony from   pediatric surgery will be available as needed.       ____________________________________     MD MARCELLO ORR / QAMAR    DD:  2017 03:39:41  DT:  2017 04:33:28    D#:  [id number]  Job#:  [id number]

## 2017-05-02 NOTE — CARE PLAN
Problem: Pain Management  Goal: Pain level will decrease to patient’s comfort goal  Intervention: Follow pain managment plan developed in collaboration with patient and Interdisciplinary Team  Patient c/o pain 8/10 in her abdomen. Medicated per MAR. See flowsheets for assessment and re-assessment of pain.      Problem: Potential knowledge deficit related to lack of understanding of self and  care  Goal: Patient will verbalize understanding of self and infant care  MOB reminded to feed NB every 2-3 hours, and that hat should stay on NB to help keep NB's temperature at the appropriate level. MOB verbalized understanding regarding these instructions.

## 2017-05-03 PROCEDURE — A9270 NON-COVERED ITEM OR SERVICE: HCPCS | Performed by: FAMILY MEDICINE

## 2017-05-03 PROCEDURE — 700102 HCHG RX REV CODE 250 W/ 637 OVERRIDE(OP): Performed by: OBSTETRICS & GYNECOLOGY

## 2017-05-03 PROCEDURE — 770002 HCHG ROOM/CARE - OB PRIVATE (112)

## 2017-05-03 PROCEDURE — 700102 HCHG RX REV CODE 250 W/ 637 OVERRIDE(OP): Performed by: FAMILY MEDICINE

## 2017-05-03 PROCEDURE — A9270 NON-COVERED ITEM OR SERVICE: HCPCS | Performed by: OBSTETRICS & GYNECOLOGY

## 2017-05-03 PROCEDURE — 700112 HCHG RX REV CODE 229: Performed by: OBSTETRICS & GYNECOLOGY

## 2017-05-03 RX ORDER — PSEUDOEPHEDRINE HCL 30 MG
100 TABLET ORAL 2 TIMES DAILY PRN
Qty: 60 CAP | Refills: 0 | Status: SHIPPED | OUTPATIENT
Start: 2017-05-03 | End: 2019-04-26

## 2017-05-03 RX ORDER — HYDROCODONE BITARTRATE AND ACETAMINOPHEN 5; 325 MG/1; MG/1
1-2 TABLET ORAL EVERY 4 HOURS PRN
Qty: 30 TAB | Refills: 0 | Status: SHIPPED | OUTPATIENT
Start: 2017-05-03 | End: 2019-04-26

## 2017-05-03 RX ORDER — IBUPROFEN 600 MG/1
600 TABLET ORAL EVERY 6 HOURS PRN
Qty: 30 TAB | Refills: 0 | Status: SHIPPED | OUTPATIENT
Start: 2017-05-03 | End: 2019-04-26

## 2017-05-03 RX ADMIN — DOCUSATE SODIUM 100 MG: 100 CAPSULE ORAL at 13:37

## 2017-05-03 RX ADMIN — IBUPROFEN 600 MG: 600 TABLET, FILM COATED ORAL at 13:34

## 2017-05-03 RX ADMIN — Medication 1 TABLET: at 09:25

## 2017-05-03 RX ADMIN — IBUPROFEN 600 MG: 600 TABLET, FILM COATED ORAL at 20:33

## 2017-05-03 RX ADMIN — OXYCODONE HYDROCHLORIDE 10 MG: 10 TABLET ORAL at 01:39

## 2017-05-03 RX ADMIN — IBUPROFEN 600 MG: 600 TABLET, FILM COATED ORAL at 01:38

## 2017-05-03 RX ADMIN — OXYCODONE HYDROCHLORIDE 10 MG: 10 TABLET ORAL at 06:35

## 2017-05-03 RX ADMIN — ANTACID TABLETS 500 MG: 500 TABLET, CHEWABLE ORAL at 01:38

## 2017-05-03 RX ADMIN — OXYCODONE HYDROCHLORIDE 10 MG: 10 TABLET ORAL at 13:34

## 2017-05-03 RX ADMIN — OXYCODONE HYDROCHLORIDE 10 MG: 10 TABLET ORAL at 20:33

## 2017-05-03 ASSESSMENT — PAIN SCALES - GENERAL
PAINLEVEL_OUTOF10: 3
PAINLEVEL_OUTOF10: 2
PAINLEVEL_OUTOF10: 7
PAINLEVEL_OUTOF10: 7
PAINLEVEL_OUTOF10: 2
PAINLEVEL_OUTOF10: 8
PAINLEVEL_OUTOF10: 2
PAINLEVEL_OUTOF10: 8

## 2017-05-03 NOTE — CARE PLAN
Problem: Potential for postpartum infection related to presence of episiotomy/vaginal tear and/or uterine contamination  Goal: Patient will be absent from signs and symptoms of infection  Outcome: PROGRESSING AS EXPECTED  No signs or symptoms of infection noted    Problem: Alteration in comfort related to episiotomy, vaginal repair and/or after birth pains  Goal: Patient is able to ambulate, care for self and infant  Outcome: PROGRESSING AS EXPECTED  States adequate relief of pain with pain meds

## 2017-05-03 NOTE — PROGRESS NOTES
"Mother reports BF \"going very well\", denies pain and/or need for assistance with BF, has been pumping as well and reports colostrum being expressed easily, encouraged to call for assistance as needed.    "

## 2017-05-03 NOTE — PROGRESS NOTES
Assessment completed. WDL. Vital signs stable. Patient progressing according to plan of care. Patient up and ambulating. Pt states she is voiding without difficulty. Patient claims to have good pain relief with prn pain medications. Pt states she would like her pain medication scheduled. Encouraged pt to feed every 3 hours. Encouraged pt to call for next feed to assess latch. Pt denies any other issues at this time. Pt encouraged to call for any needs.

## 2017-05-03 NOTE — DISCHARGE SUMMARY
Discharge Summary:      Little Mitchell      Admit Date:   2017  Discharge Date:  5/3/2017     Admitting diagnosis:  Pregnancy   Labor and delivery, indication for care  Discharge Diagnosis: Status post vaginal, spontaneous.  Pregnancy Complications: systemic lupus erythematous; morbid obesity   Tubal Ligation:  no        History:  Past Medical History   Diagnosis Date   • Hypertension      OB History    Para Term  AB SAB TAB Ectopic Multiple Living   5 3 3  1 1    3      # Outcome Date GA Lbr Dino/2nd Weight Sex Delivery Anes PTL Lv   5 Current            4 Term     F Vag-Spont   Y      Comments: System Generated. Please review and update pregnancy details.   3 SAB            2 Term     F Vag-Spont   Y   1 Term     F Vag-Spont   Y           Asa  Patient Active Problem List    Diagnosis Date Noted   • Labor and delivery, indication for care 2017   • Congenital pulmonary airway malformation (CPAM) 2017   • SLE with normal kidneys (CMS-HCC) 2016   • High-risk pregnancy 2016   • Morbid obesity due to excess calories (CMS-HCC) 2016        Hospital Course:   31 y.o. , now para 4, was admitted with the above mentioned diagnosis, underwent Induction of Labor for congenital pulmonary airway malformation suspected on ultrasound , vaginal, spontaneous. Patient postpartum course was unremarkable, with progressive advancement in diet , ambulation and toleration of oral analgesia. Patient without complaints today and desires discharge.      Filed Vitals:    17 0530 17 0800 17 1208 17   BP: 131/62 103/71 118/79 113/60   Pulse: 95 81 83 74   Temp:  36.7 °C (98.1 °F) 36.8 °C (98.3 °F) 36.6 °C (97.8 °F)   TempSrc:       Resp:  20 20 18   Height:       Weight:       SpO2:  95%  95%       Current Facility-Administered Medications   Medication Dose   • ondansetron (ZOFRAN ODT) dispertab 4 mg  4 mg    Or   • ondansetron (ZOFRAN) syringe/vial  injection 4 mg  4 mg   • oxytocin (PITOCIN) infusion (for postpartum)   mL/hr   • acetaminophen (TYLENOL) tablet 325 mg  325 mg   • oxycodone immediate-release (ROXICODONE) tablet 5 mg  5 mg   • oxycodone immediate release (ROXICODONE) tablet 10 mg  10 mg   • LR infusion     • PRN oxytocin (PITOCIN) (20 Units/1000 mL) PRN for excessive uterine bleeding - See Admin Instr  125-999 mL/hr   • misoprostol (CYTOTEC) tablet 600 mcg  600 mcg   • methylergonovine (METHERGINE) injection 0.2 mg  0.2 mg   • docusate sodium (COLACE) capsule 100 mg  100 mg   • bisacodyl (DULCOLAX) suppository 10 mg  10 mg   • magnesium hydroxide (MILK OF MAGNESIA) suspension 30 mL  30 mL   • prenatal plus vitamin (STUARTNATAL 1+1) 27-1 MG tablet 1 Tab  1 Tab   • ibuprofen (MOTRIN) tablet 600 mg  600 mg   • lactated ringers infusion     • calcium carbonate (TUMS) chewable tab 500 mg  500 mg   • acetaminophen (TYLENOL) tablet 650 mg  650 mg       Exam:  Breast Exam: negative  Abdomen: Abdomen soft, non-tender. BS normal. No masses,  No organomegaly  Fundus Non Tender: yes  Incision: none  Perineum: perineum intact  Extremity: no edema, redness or tenderness in the calves or thighs, feet normal, good pulses, normal color, temperature and sensation     Labs:  Recent Labs      05/01/17   0829  05/02/17   1406   WBC  8.5  10.7   RBC  4.19*  3.97*   HEMOGLOBIN  12.1  11.3*   HEMATOCRIT  35.4*  33.7*   MCV  84.5  84.9   MCH  28.9  28.5   MCHC  34.2  33.5*   RDW  42.1  42.9   PLATELETCT  171  173   MPV  10.1  10.3        Activity:   Discharge to home  Pelvic Rest x 6 weeks    Assessment:  normal postpartum course  Discharge Assessment: Taking adequate diet and fluids, No heavy bleeding or foul vaginal discharge , No breast redness or severe pain of breast. Voiding spontaneously.      Follow up: [facility] in 5 weeks for vaginal.   To resume daily PNV and iron supplement if needed with hydration.   Patient to RT TPC or ER if any of the  following occur:  Fever over 100.5  Severe abdominal pain  Red streaks or painful masses in the breasts  Foul smelling discharge or lochia  Heavy vaginal bleeding saturating a pad per hour  S/s of PP depression     Discharge Meds:   Current Outpatient Prescriptions   Medication Sig Dispense Refill   • ibuprofen (MOTRIN) 600 MG Tab Take 1 Tab by mouth every 6 hours as needed for Mild Pain. 30 Tab 0   • docusate sodium 100 MG Cap Take 100 mg by mouth 2 times a day as needed for Constipation. 60 Cap 0   • hydrocodone-acetaminophen (NORCO) 5-325 MG Tab per tablet Take 1-2 Tabs by mouth every four hours as needed. 30 Tab 0       Mika Trammell M.D.

## 2017-05-03 NOTE — CARE PLAN
Problem: Potential for postpartum infection related to presence of episiotomy/vaginal tear and/or uterine contamination  Goal: Patient will be absent from signs and symptoms of infection  Outcome: PROGRESSING AS EXPECTED  Patient is afebrile. No signs and symptoms of infection noted.     Problem: Alteration in comfort related to episiotomy, vaginal repair and/or after birth pains  Goal: Patient is able to ambulate, care for self and infant  Outcome: PROGRESSING AS EXPECTED  Pt able to ambulate, care for self and infant. Pt states her pain is controlled with prn pain medication. Pt's pain reassessed throughout this shift.

## 2017-05-04 VITALS
OXYGEN SATURATION: 97 % | WEIGHT: 293 LBS | BODY MASS INDEX: 45.99 KG/M2 | SYSTOLIC BLOOD PRESSURE: 142 MMHG | DIASTOLIC BLOOD PRESSURE: 88 MMHG | TEMPERATURE: 97 F | RESPIRATION RATE: 20 BRPM | HEIGHT: 67 IN | HEART RATE: 82 BPM

## 2017-05-04 PROCEDURE — 700102 HCHG RX REV CODE 250 W/ 637 OVERRIDE(OP): Performed by: FAMILY MEDICINE

## 2017-05-04 PROCEDURE — A9270 NON-COVERED ITEM OR SERVICE: HCPCS | Performed by: FAMILY MEDICINE

## 2017-05-04 PROCEDURE — 700102 HCHG RX REV CODE 250 W/ 637 OVERRIDE(OP): Performed by: OBSTETRICS & GYNECOLOGY

## 2017-05-04 PROCEDURE — A9270 NON-COVERED ITEM OR SERVICE: HCPCS | Performed by: OBSTETRICS & GYNECOLOGY

## 2017-05-04 RX ORDER — FAMOTIDINE 20 MG/1
20 TABLET, FILM COATED ORAL 2 TIMES DAILY
Status: DISCONTINUED | OUTPATIENT
Start: 2017-05-04 | End: 2017-05-04 | Stop reason: HOSPADM

## 2017-05-04 RX ORDER — FAMOTIDINE 20 MG/1
20 TABLET, FILM COATED ORAL 2 TIMES DAILY
Qty: 60 TAB | Refills: 0 | Status: SHIPPED | OUTPATIENT
Start: 2017-05-04 | End: 2019-04-26

## 2017-05-04 RX ADMIN — FAMOTIDINE 20 MG: 20 TABLET, FILM COATED ORAL at 09:35

## 2017-05-04 RX ADMIN — IBUPROFEN 600 MG: 600 TABLET, FILM COATED ORAL at 09:35

## 2017-05-04 RX ADMIN — Medication 1 TABLET: at 09:35

## 2017-05-04 RX ADMIN — OXYCODONE HYDROCHLORIDE 10 MG: 10 TABLET ORAL at 09:35

## 2017-05-04 RX ADMIN — OXYCODONE HYDROCHLORIDE 10 MG: 10 TABLET ORAL at 02:09

## 2017-05-04 ASSESSMENT — PAIN SCALES - GENERAL
PAINLEVEL_OUTOF10: 7
PAINLEVEL_OUTOF10: 2

## 2017-05-04 NOTE — DISCHARGE SUMMARY
Discharge Summary:      Little Mitchell      Admit Date:   2017  Discharge Date:  2017     Admitting diagnosis:  Pregnancy   Labor and delivery, indication for care  Discharge Diagnosis: Status post vaginal, spontaneous.  Pregnancy Complications: systemic lupus erythematous; morbid obesity    Tubal Ligation:  no        History:  Past Medical History   Diagnosis Date   • Hypertension      OB History    Para Term  AB SAB TAB Ectopic Multiple Living   5 3 3  1 1    3      # Outcome Date GA Lbr Dino/2nd Weight Sex Delivery Anes PTL Lv   5 Current            4 Term     F Vag-Spont   Y      Comments: System Generated. Please review and update pregnancy details.   3 SAB            2 Term     F Vag-Spont   Y   1 Term     F Vag-Spont   Y           Asa  Patient Active Problem List    Diagnosis Date Noted   • Labor and delivery, indication for care 2017   • Congenital pulmonary airway malformation (CPAM) 2017   • SLE with normal kidneys (CMS-HCC) 2016   • High-risk pregnancy 2016   • Morbid obesity due to excess calories (CMS-HCC) 2016        Hospital Course:   31 y.o. , now para 4, was admitted with the above mentioned diagnosis, underwent Induction of Labor for congenital pulmonary airway malformation suspected on ultrasound , vaginal, spontaneous. Patient postpartum course was unremarkable, with progressive advancement in diet , ambulation and toleration of oral analgesia. Patient without complaints today and desires discharge.      Filed Vitals:    17 1208 17 0808 17   BP: 118/79 113/60 114/77 130/85   Pulse: 83 74 80 77   Temp: 36.8 °C (98.3 °F) 36.6 °C (97.8 °F) 36.7 °C (98 °F) 36.3 °C (97.3 °F)   TempSrc:       Resp: 20 18 18 20   Height:       Weight:       SpO2:  95% 96% 100%       Current Facility-Administered Medications   Medication Dose   • ondansetron (ZOFRAN ODT) dispertab 4 mg  4 mg    Or   • ondansetron (ZOFRAN)  syringe/vial injection 4 mg  4 mg   • oxytocin (PITOCIN) infusion (for postpartum)   mL/hr   • acetaminophen (TYLENOL) tablet 325 mg  325 mg   • oxycodone immediate-release (ROXICODONE) tablet 5 mg  5 mg   • oxycodone immediate release (ROXICODONE) tablet 10 mg  10 mg   • LR infusion     • PRN oxytocin (PITOCIN) (20 Units/1000 mL) PRN for excessive uterine bleeding - See Admin Instr  125-999 mL/hr   • misoprostol (CYTOTEC) tablet 600 mcg  600 mcg   • methylergonovine (METHERGINE) injection 0.2 mg  0.2 mg   • docusate sodium (COLACE) capsule 100 mg  100 mg   • bisacodyl (DULCOLAX) suppository 10 mg  10 mg   • magnesium hydroxide (MILK OF MAGNESIA) suspension 30 mL  30 mL   • prenatal plus vitamin (STUARTNATAL 1+1) 27-1 MG tablet 1 Tab  1 Tab   • ibuprofen (MOTRIN) tablet 600 mg  600 mg   • lactated ringers infusion     • calcium carbonate (TUMS) chewable tab 500 mg  500 mg   • acetaminophen (TYLENOL) tablet 650 mg  650 mg       Exam:  Breast Exam: negative  Abdomen: Abdomen soft, non-tender. BS normal. No masses,  No organomegaly  Fundus Non Tender: yes  Incision: none  Perineum: perineum intact  Extremity: no edema, redness or tenderness in the calves or thighs, feet normal, good pulses, normal color, temperature and sensation     Labs:  Recent Labs      05/01/17   0829  05/02/17   1406   WBC  8.5  10.7   RBC  4.19*  3.97*   HEMOGLOBIN  12.1  11.3*   HEMATOCRIT  35.4*  33.7*   MCV  84.5  84.9   MCH  28.9  28.5   MCHC  34.2  33.5*   RDW  42.1  42.9   PLATELETCT  171  173   MPV  10.1  10.3        Activity:   Discharge to home  Pelvic Rest x 6 weeks    Assessment:  normal postpartum course  Discharge Assessment: Taking adequate diet and fluids, No heavy bleeding or foul vaginal discharge , No breast redness or severe pain of breast. Voiding spontaneously.   Also with heartburn; would like something for heartburn.      Follow up: [facility] or [facility] in 5 weeks for vaginal.   To resume daily PNV and iron  supplement if needed with hydration.   Patient to [facility] or ER if any of the following occur:  Fever over 100.5  Severe abdominal pain  Red streaks or painful masses in the breasts  Foul smelling discharge or lochia  Heavy vaginal bleeding saturating a pad per hour  S/s of PP depression     Discharge Meds:   Current Outpatient Prescriptions   Medication Sig Dispense Refill   • ibuprofen (MOTRIN) 600 MG Tab Take 1 Tab by mouth every 6 hours as needed for Mild Pain. 30 Tab 0   • docusate sodium 100 MG Cap Take 100 mg by mouth 2 times a day as needed for Constipation. 60 Cap 0   • hydrocodone-acetaminophen (NORCO) 5-325 MG Tab per tablet Take 1-2 Tabs by mouth every four hours as needed. 30 Tab 0     Zantac 150mg po BID    Mika Trammell M.D.

## 2017-05-04 NOTE — PROGRESS NOTES
Pt doing well bonding with baby, assessment done complained of moderate abdominal pain medicated her as per doctor orders, Needs attended.

## 2017-05-04 NOTE — CARE PLAN
Problem: Altered physiologic condition related to immediate post-delivery state and potential for bleeding/hemorrhage  Goal: Patient physiologically stable as evidenced by normal lochia, palpable uterine involution and vital signs within normal limits  Outcome: PROGRESSING AS EXPECTED  Fundal massage done with light bleeding    Problem: Alteration in comfort related to episiotomy, vaginal repair and/or after birth pains  Goal: Patient is able to ambulate, care for self and infant  Outcome: PROGRESSING AS EXPECTED  Pain medicine given as requested

## 2017-05-04 NOTE — PROGRESS NOTES
Discharged home with baby . Ambulates with steady gate. insructions given on infant care and self care

## 2017-05-04 NOTE — DISCHARGE INSTRUCTIONS
POSTPARTUM DISCHARGE INSTRUCTIONS FOR MOM    YOB: 1985   Age: 31 y.o.               Admit Date: 2017     Discharge Date: 2017  Attending Doctor:  Rajan Mora M.D.                  Allergies:  Asa    Discharged to home by car. Discharged via wheelchair, hospital escort: Yes.  Special equipment needed: Not Applicable  Belongings with: Personal  Be sure to schedule a follow-up appointment with your primary care doctor or any specialists as instructed.     Discharge Plan:   Diet Plan: Discussed  Activity Level: Discussed  Confirmed Follow up Appointment: Appointment Scheduled  Confirmed Symptoms Management: Discussed  Medication Reconciliation Updated: Yes  Pneumococcal Vaccine Given - only chart on this line when given: Given (See MAR)  Influenza Vaccine Indication: Not indicated: Previously immunized this influenza season and > 8 years of age    REASONS TO CALL YOUR OBSTETRICIAN:  1.   Persistent fever or shaking chills (Temperature higher than 100.4)  2.   Heavy bleeding (soaking more than 1 pad per hour); Passing clots  3.   Foul odor from vagina  4.   Mastitis (Breast infection; breast pain, chills, fever, redness)  5.   Urinary pain, burning or frequency  6.   Episiotomy infection  7.   Abdominal incision infection  8.   Severe depression longer than 24 hours    HAND WASHING  · Prior to handling the baby.  · Before breastfeeding or bottle feeding baby.  · After using the bathroom or changing the baby's diaper.    WOUND CARE  Ask your physician for additional care instructions.  In general:    ·  Incision:      · Keep clean and dry.    · Do NOT lift anything heavier than your baby for up to 6 weeks.    · There should not be any opening or pus.      VAGINAL CARE  · Nothing inside vagina for 6 weeks: no sexual intercourse, tampons or douching.  · Bleeding may continue for 2-4 weeks.  Amount may vary.    · Call your physician for heavy bleeding which means soaking more than 1 pad per  "hour    BIRTH CONTROL  · It is possible to become pregnant at any time after delivery and while breastfeeding.  · Plan to discuss a method of birth control with your physician at your follow up visit. visit.    DIET AND ELIMINATION  · Eating more fiber (bran cereal, fruits, and vegetables) and drinking plenty of fluids will help to avoid constipation.  · Urinary frequency after childbirth is normal.    POSTPARTUM BLUES  During the first few days after birth, you may experience a sense of the \"blues\" which may include impatience, irritability or even crying.  These feeling come and go quickly.  However, as many as 1 in 10 women experience emotional symptoms known as postpartum depression.    Postpartum depression:  May start as early as the second or third day after delivery or take several weeks or months to develop.  Symptoms of \"blues\" are present, but are more intense:  Crying spells; loss of appetite; feelings of hopelessness or loss of control; fear of touching the baby; over concern or no concern at all about the baby; little or no concern about your own appearance/caring for yourself; and/or inability to sleep or excessive sleeping.  Contact your physician if you are experiencing any of these symptoms.    Crisis Hotline:  · [place] Crisis Hotline:  7-050-BKRMHQR  Or 1-909.735.7531  · [place] Crisis Hotline:  1-963.114.2916  Or 260-104-0229    PREVENTING SHAKEN BABY:  If you are angry or stressed, PUT THE BABY IN THE CRIB, step away, take some deep breaths, and wait until you are calm to care for the baby.  DO NOT SHAKE THE BABY.  You are not alone, call a supporter for help.    · Crisis Call Center 24/7 crisis line 931-825-2753 or 1-540.502.2532  · You can also text them, text \"ANSWER\" to 975051    QUIT SMOKING/TOBACCO USE:  I understand the use of any tobacco products increases my chance of suffering from future heart disease and could cause other illnesses which may shorten my life. Quitting the use of " tobacco products is the single most important thing I can do to improve my health. For further information on smoking / tobacco cessation call a Toll Free Quit Line at 1-121.893.3072 (*[employer] [place]) or 1-313.828.8258 (American Lung Association) or you can access the web based program at www.lungusa.org.    · [place] Tobacco Users Help Line:  (785) 476-5117       Toll Free: 1-468.269.3639  · Quit Tobacco Program [facility] (336)656-1916    DEPRESSION / SUICIDE RISK:  As you are discharged from this [facility], it is important to learn how to keep safe from harming yourself.    Recognize the warning signs:  · Abrupt changes in personality, positive or negative- including increase in energy   · Giving away possessions  · Change in eating patterns- significant weight changes-  positive or negative  · Change in sleeping patterns- unable to sleep or sleeping all the time   · Unwillingness or inability to communicate  · Depression  · Unusual sadness, discouragement and loneliness  · Talk of wanting to die  · Neglect of personal appearance   · Rebelliousness- reckless behavior  · Withdrawal from people/activities they love  · Confusion- inability to concentrate     If you or a loved one observes any of these behaviors or has concerns about self-harm, here's what you can do:  · Talk about it- your feelings and reasons for harming yourself  · Remove any means that you might use to hurt yourself (examples: pills, rope, extension cords, firearm)  · Get professional help from the community (Mental Health, Substance Abuse, psychological counseling)  · Do not be alone:Call your Safe Contact- someone whom you trust who will be there for you.  · Call your local CRISIS HOTLINE 743-3560 or 351-110-2593  · Call your local Children's Mobile Crisis Response Team [place] (962) 320-8892 or www.[employer] [place] [place]  · Call the toll free National Suicide Prevention Hotlines   · National  Suicide Prevention Lifeline 798-467-SQLZ (0196)  · [facility] 800-SUICIDE (775-2318)    DISCHARGE SURVEY:  Thank you for choosing [facility].  We hope we provided you with very good care.  You may be receiving a survey in the mail.  Please fill it out.  Your opinion is valuable to us.    ADDITIONAL EDUCATIONAL MATERIALS GIVEN TO PATIENT:        My signature on this form indicates that:  1.  I have reviewed and understand the above information  2.  My questions regarding this information have been answered to my satisfaction.  3.  I have formulated a plan with my discharge nurse to obtain my prescribed medication for home.

## 2017-05-30 ENCOUNTER — TELEPHONE (OUTPATIENT)
Dept: OBGYN | Facility: CLINIC | Age: 32
End: 2017-05-30

## 2017-05-30 NOTE — TELEPHONE ENCOUNTER
Called pt -   Pt request to be called back in an hour      Consulted with Dr Mora he is okay with Pt being released back

## 2017-06-12 ENCOUNTER — POST PARTUM (OUTPATIENT)
Dept: OBGYN | Facility: CLINIC | Age: 32
End: 2017-06-12
Payer: COMMERCIAL

## 2017-06-12 VITALS
HEIGHT: 62 IN | DIASTOLIC BLOOD PRESSURE: 82 MMHG | SYSTOLIC BLOOD PRESSURE: 118 MMHG | BODY MASS INDEX: 51.34 KG/M2 | WEIGHT: 279 LBS

## 2017-06-12 PROCEDURE — 90050 PR POSTPARTUM VISIT: CPT | Performed by: OBSTETRICS & GYNECOLOGY

## 2017-06-12 NOTE — PROGRESS NOTES
Postpartum;    Little Mitchell is 31 y.o. female  status post , doing well today. Currently nursing without difficulty    Physical examination;    Breast examination-no dominant masses, no skin retraction, no axillary adenopathy, no evidence of mastitis    Pelvic examination;  External genitalia-no visible lesions  Vagina-no discharge or blood  Cervix-no gross lesions  Uterus-normal size and shape, nontender  Adnexa without mass or tenderness     Abdomen-nondistended positive bowel sounds soft nontender without masses or hepatosplenomegaly      Impression;  Normal postpartum    Plan;  Birth control Mirena IUD  Annual one year from previous

## 2017-06-12 NOTE — NON-PROVIDER
Called [facility] - spoke with Krystle - that they are waiting for the pt to call them back   Krystle stated its covered & no PA is needed   [phone]

## 2017-06-12 NOTE — MR AVS SNAPSHOT
"        Little Mitchell   2017 10:15 AM   Post Partum   MRN: [id number]    Department:  [facility]   Dept Phone:  [phone]    Description:  Female : 1985   Provider:  Rajan Mora M.D.           Reason for Visit     Postpartum care           Allergies as of 2017     Allergen Noted Reactions    Asa [Aspirin] 2014       tachycardia      You were diagnosed with     Postpartum care and examination   [[id number]]         Vital Signs     Blood Pressure Height Weight Body Mass Index Last Menstrual Period Breastfeeding?    118/82 mmHg 1.575 m (5' 2\") 126.554 kg (279 lb) 51.02 kg/m2 2016 Yes    Smoking Status                   Never Smoker            Basic Information     Date Of Birth Sex Race Ethnicity Preferred Language    1985 Female White Non- English      Problem List              ICD-10-CM Priority Class Noted - Resolved    High-risk pregnancy O09.90   11/3/2016 - Present    Morbid obesity due to excess calories (CMS-HCC) E66.01   11/3/2016 - Present    SLE with normal kidneys (CMS-HCC) M32.9   2016 - Present    Congenital pulmonary airway malformation (CPAM) Q33.0   2017 - Present    Labor and delivery, indication for care O75.9   2017 - Present      Health Maintenance        Date Due Completion Dates    PAP SMEAR 2017    IMM DTaP/Tdap/Td Vaccine (4 - Td) 2021, 10/17/2007, 2003            Current Immunizations     Hepatitis B Vaccine Non-Recombivax (Ped/Adol) 2000, 2000, 1999    Influenza Vaccine Quad Inj (Preserved) 1/3/2013, 2011    MMR Vaccine 10/1/2009, 1988    Pneumococcal polysaccharide vaccine (PPSV-23) 2017  8:38 AM    TD Vaccine 2003    Tdap Vaccine 2011, 10/17/2007    Tuberculin Skin Test 12/10/2015      Below and/or attached are the medications your provider expects you to take. Review all of your home medications and newly ordered medications with your provider " and/or pharmacist. Follow medication instructions as directed by your provider and/or pharmacist. Please keep your medication list with you and share with your provider. Update the information when medications are discontinued, doses are changed, or new medications (including over-the-counter products) are added; and carry medication information at all times in the event of emergency situations     Allergies:  ASA - (reactions not documented)               Medications  Valid as of: June 12, 2017 - 11:06 AM    Generic Name Brand Name Tablet Size Instructions for use    Docusate Sodium (Cap)  MG Take 100 mg by mouth 2 times a day as needed for Constipation.        Famotidine (Tab) PEPCID 20 MG Take 1 Tab by mouth 2 Times a Day.        Hydrocodone-Acetaminophen (Tab) NORCO 5-325 MG Take 1-2 Tabs by mouth every four hours as needed.        Ibuprofen (Tab) MOTRIN 600 MG Take 1 Tab by mouth every 6 hours as needed for Mild Pain.        .                 Medicines prescribed today were sent to:     [facility] #[id number] - [place], [place] - [address] [address]    Phone: [phone] Fax: [phone]    Open 24 Hours?: No      Medication refill instructions:       If your prescription bottle indicates you have medication refills left, it is not necessary to call your provider’s office. Please contact your pharmacy and they will refill your medication.    If your prescription bottle indicates you do not have any refills left, you may request refills at any time through one of the following ways: The online [employer] system (except Urgent Care), by calling your provider’s office, or by asking your pharmacy to contact your provider’s office with a refill request. Medication refills are processed only during regular business hours and may not be available until the next business day. Your provider may request additional information or to have a follow-up visit with  you prior to refilling your medication.   *Please Note: Medication refills are assigned a new Rx number when refilled electronically. Your pharmacy may indicate that no refills were authorized even though a new prescription for the same medication is available at the pharmacy. Please request the medicine by name with the pharmacy before contacting your provider for a refill.        Referral     A referral request has been sent to our patient care coordination department. Please allow 3-5 business days for us to process this request and contact you either by phone or mail. If you do not hear from us by the 5th business day, please call us at [phone].           "[employer] Access Code: Activation code not generated  Current [employer] Status: Active

## 2017-07-21 ENCOUNTER — GYNECOLOGY VISIT (OUTPATIENT)
Dept: OBGYN | Facility: CLINIC | Age: 32
End: 2017-07-21
Payer: COMMERCIAL

## 2017-07-21 VITALS
SYSTOLIC BLOOD PRESSURE: 124 MMHG | HEIGHT: 62 IN | BODY MASS INDEX: 53.18 KG/M2 | DIASTOLIC BLOOD PRESSURE: 70 MMHG | WEIGHT: 289 LBS

## 2017-07-21 DIAGNOSIS — Z30.430 ENCOUNTER FOR IUD INSERTION: ICD-10-CM

## 2017-07-21 PROCEDURE — 58300 INSERT INTRAUTERINE DEVICE: CPT | Performed by: OBSTETRICS & GYNECOLOGY

## 2017-07-21 NOTE — PROGRESS NOTES
Procedure note; Jennifer clinical IUD insertion    Informed consent obtained, consent signed-risks discussed include; risk of pain bleeding infection perforation of the uterus possible pregnancy possible irregular menstrual cycles including increased bleeding or amenorrhea. If IUD perforates the uterus, the patient will require laparoscopy to retrieve the IUD.IUD does not protect against STDs or ectopic pregnancy.    Pregnancy test negative    Bimanual exam reveals anteverted uterus nontender    Vaginal speculum placed    Betadine prep to cervix and vagina    Single-toothed tenaculum placed on the anterior lip of the cervix    Uterus sounded-8.5 cm and anteverted    Jennifer IUD inserted without difficulty or complications    Strings trimmed    Patient tolerated procedure well    Followup in 2 weeks for IUD check up

## 2017-07-21 NOTE — MR AVS SNAPSHOT
"        Little Mitchell   2017 10:45 AM   Gynecology Visit   MRN: [id number]    Department:  [facility]   Dept Phone:  [phone]    Description:  Female : 1985   Provider:  Rajan Mora M.D.           Reason for Visit     Procedure           Allergies as of 2017     Allergen Noted Reactions    Asa [Aspirin] 2014       tachycardia      You were diagnosed with     Encounter for IUD insertion   [[id number]]         Vital Signs     Blood Pressure Height Weight Body Mass Index Breastfeeding? Smoking Status    124/70 mmHg 1.575 m (5' 2\") 131.09 kg (289 lb) 52.85 kg/m2 Yes Never Smoker       Basic Information     Date Of Birth Sex Race Ethnicity Preferred Language    1985 Female White Non- English      Problem List              ICD-10-CM Priority Class Noted - Resolved    High-risk pregnancy O09.90   11/3/2016 - Present    Morbid obesity due to excess calories (CMS-HCC) E66.01   11/3/2016 - Present    SLE with normal kidneys (CMS-HCC) M32.9   2016 - Present    Congenital pulmonary airway malformation (CPAM) Q33.0   2017 - Present    Labor and delivery, indication for care O75.9   2017 - Present      Health Maintenance        Date Due Completion Dates    PAP SMEAR 2017    IMM INFLUENZA (1) 2017 1/3/2013, 2011    IMM DTaP/Tdap/Td Vaccine (4 - Td) 2021, 10/17/2007, 2003            Current Immunizations     Hepatitis B Vaccine Non-Recombivax (Ped/Adol) 2000, 2000, 1999    Influenza Vaccine Quad Inj (Preserved) 1/3/2013, 2011    MMR Vaccine 10/1/2009, 1988    Pneumococcal polysaccharide vaccine (PPSV-23) 2017  8:38 AM    TD Vaccine 2003    Tdap Vaccine 2011, 10/17/2007    Tuberculin Skin Test 12/10/2015      Below and/or attached are the medications your provider expects you to take. Review all of your home medications and newly ordered medications with your provider and/or " pharmacist. Follow medication instructions as directed by your provider and/or pharmacist. Please keep your medication list with you and share with your provider. Update the information when medications are discontinued, doses are changed, or new medications (including over-the-counter products) are added; and carry medication information at all times in the event of emergency situations     Allergies:  ASA - (reactions not documented)               Medications  Valid as of: July 21, 2017 - 12:56 PM    Generic Name Brand Name Tablet Size Instructions for use    Docusate Sodium (Cap)  MG Take 100 mg by mouth 2 times a day as needed for Constipation.        Famotidine (Tab) PEPCID 20 MG Take 1 Tab by mouth 2 Times a Day.        Hydrocodone-Acetaminophen (Tab) NORCO 5-325 MG Take 1-2 Tabs by mouth every four hours as needed.        Ibuprofen (Tab) MOTRIN 600 MG Take 1 Tab by mouth every 6 hours as needed for Mild Pain.        .                 Medicines prescribed today were sent to:     [facility]/PHARMACY #[id number] - [place], [place] - [address] [address]    Phone: [phone] Fax: [phone]    Open 24 Hours?: No      Medication refill instructions:       If your prescription bottle indicates you have medication refills left, it is not necessary to call your provider’s office. Please contact your pharmacy and they will refill your medication.    If your prescription bottle indicates you do not have any refills left, you may request refills at any time through one of the following ways: The online [employer] system (except Urgent Care), by calling your provider’s office, or by asking your pharmacy to contact your provider’s office with a refill request. Medication refills are processed only during regular business hours and may not be available until the next business day. Your provider may request additional information or to have a follow-up visit with you  prior to refilling your medication.   *Please Note: Medication refills are assigned a new Rx number when refilled electronically. Your pharmacy may indicate that no refills were authorized even though a new prescription for the same medication is available at the pharmacy. Please request the medicine by name with the pharmacy before contacting your provider for a refill.           [employer] Access Code: Activation code not generated  Current [employer] Status: Active

## 2017-09-14 ENCOUNTER — NON-PROVIDER VISIT (OUTPATIENT)
Dept: HEALTH INFORMATION MANAGEMENT | Facility: MEDICAL CENTER | Age: 32
End: 2017-09-14
Payer: COMMERCIAL

## 2017-09-14 VITALS — HEIGHT: 67 IN | BODY MASS INDEX: 45.99 KG/M2 | WEIGHT: 293 LBS

## 2017-09-14 DIAGNOSIS — E66.01 MORBID OBESITY DUE TO EXCESS CALORIES (HCC): ICD-10-CM

## 2017-09-14 PROCEDURE — 97802 MEDICAL NUTRITION INDIV IN: CPT | Performed by: DIETITIAN, REGISTERED

## 2017-09-14 NOTE — PROGRESS NOTES
"9/14/2017    [facility] (Inactive)  32 y.o.   Time in/out: 1038 - 1102    Anthropometrics/Objective  Vitals:    09/14/17 1118   Weight: (!) 137.3 kg (302 lb 11.2 oz)   Height: 1.702 m (5' 7\")       Body mass index is 47.41 kg/m².    Stated Goal Weight: < 200#    Subjective:  -Wants to have bariatric surgery with Dr. Ganser at [facility]  -States she was told she needs to lose 16# before she can have the surgery  -Drinks 1-2 sodas/day  -Irregular breakfast eater    Nutrition Diagnosis (PES Statement)    Morbid obesity related to excessive energy intake and inadequate energy expenditure as evidenced by BMI > 40.    Nutrition Intervention  Meal and Snack  Recommend a general/healthful diet    Comprehensive Nutrition education Instruction or training leading to in-depth nutrition related knowledge about:  Combine carb, protein and fat at each meal, Meal timing and spacing, Portion control, Sweets and alcohol in moderation and Handouts provided regarding topics discussed    Monitoring & Evaluation Plan  Behavioral-Environmental:  Behavior:  Eat protein with all meals  Physical activity:  Continue as tolerated    Food / Nutrient Intake:  Food intake:  Drink a protein shake for breakfast daily  Fluid intake:  Wean down soda intake until drinking no soda    Physical Signs / Symptoms:  Weight change:  16# weight loss      Assessment Notes:  Little was on a tight schedule today and she did not let me know that until we had started so we were not able to go over much today.  I identified a few things to start changing now that will help her after the surgery and get her some weight loss.  She will start consuming a breakfast daily by drinking a protein shake daily; this will get her used to drinking them as well as starting her metabolism early in the day.  She is also going to wean down her soda intake until she is drinking no soda; she admits she has tried to stop \"cold turkey\" and got headaches, likely " associated with an addiction to caffeine.  She will start consuming a daily afternoon snack of protein only or CHO with protein now as well.  We will be moving onto improving portions at our next appointment as well as tips to slow down at meals and fluids.

## 2017-10-26 ENCOUNTER — APPOINTMENT (OUTPATIENT)
Dept: HEALTH INFORMATION MANAGEMENT | Facility: MEDICAL CENTER | Age: 32
End: 2017-10-26
Payer: COMMERCIAL

## 2017-11-09 ENCOUNTER — NON-PROVIDER VISIT (OUTPATIENT)
Dept: HEALTH INFORMATION MANAGEMENT | Facility: MEDICAL CENTER | Age: 32
End: 2017-11-09
Payer: COMMERCIAL

## 2017-11-09 VITALS — HEIGHT: 67 IN | BODY MASS INDEX: 45.99 KG/M2 | WEIGHT: 293 LBS

## 2017-11-09 DIAGNOSIS — E66.01 MORBID OBESITY DUE TO EXCESS CALORIES (HCC): ICD-10-CM

## 2017-11-09 PROCEDURE — 97803 MED NUTRITION INDIV SUBSEQ: CPT | Performed by: DIETITIAN, REGISTERED

## 2017-11-09 NOTE — PROGRESS NOTES
"Nutrition Reassess    11/9/2017    [facility] (Inactive)   32 y.o.   Time in/out:  1034 - 1051    Subjective:  -Drinking 1-3 cans of soda daily  -Eating Oreo cookies daily because they are in her house because she \"bought them for my kids\"  -Eating breakfast now (leftovers from dinner)  -No regular exercise  -Not drinking protein shakes yet    Anthropometrics/Objective  Vitals:    11/09/17 1053   Weight: (!) 145.7 kg (321 lb 1.6 oz)   Height: 1.702 m (5' 7\")     Body mass index is 50.29 kg/m².    Previous weight/date: 302.7#  Change:  + 18.4#    ReAssesment/Notes:  I tried to explain to Little that I am trying to help her get ready for the surgery by changing habits now that will help her after the surgery.  She needs to eliminate soda and stop eating cookies because she is clearly not buying them for her kids if she is eating them.  We reviewed some portion control today and I have started her out with eating no more than a fist-sized portion of starch at meals and I also want her to start eating her protein first, if she can.  Finally, she has agreed to try drinking a protein shake for lunch on two days each week since she has not yet tried one yet.  I see her again in two weeks where we can start talking about eating after the surgery.    Follow-up: 2 weeks    "

## 2018-03-28 ENCOUNTER — PATIENT MESSAGE (OUTPATIENT)
Dept: OBGYN | Facility: MEDICAL CENTER | Age: 33
End: 2018-03-28

## 2018-03-28 ENCOUNTER — GYNECOLOGY VISIT (OUTPATIENT)
Dept: OBGYN | Facility: CLINIC | Age: 33
End: 2018-03-28
Payer: COMMERCIAL

## 2018-03-28 ENCOUNTER — TELEPHONE (OUTPATIENT)
Dept: OBGYN | Facility: MEDICAL CENTER | Age: 33
End: 2018-03-28

## 2018-03-28 VITALS
DIASTOLIC BLOOD PRESSURE: 88 MMHG | BODY MASS INDEX: 45.99 KG/M2 | WEIGHT: 293 LBS | SYSTOLIC BLOOD PRESSURE: 134 MMHG | HEIGHT: 67 IN

## 2018-03-28 DIAGNOSIS — R10.2 PELVIC PAIN: ICD-10-CM

## 2018-03-28 DIAGNOSIS — Z30.09 FAMILY PLANNING: ICD-10-CM

## 2018-03-28 DIAGNOSIS — Z30.431 IUD CHECK UP: ICD-10-CM

## 2018-03-28 PROCEDURE — 99214 OFFICE O/P EST MOD 30 MIN: CPT | Performed by: OBSTETRICS & GYNECOLOGY

## 2018-03-28 NOTE — TELEPHONE ENCOUNTER
Called pt to inform that we need her to sign order form for Mirena IUD. Voicemail was not set up, unable to leave message. Also sent pt [employer] message.

## 2018-03-28 NOTE — TELEPHONE ENCOUNTER
Pt called and states she is having issues such as cramping, painful sex, and boyfriend uncomfortable and being poked. Pt wants to be seen to have it looked at to make sure it is in the right location and why it is hurting her. Had Myrna to schedule appointment for the patient with Dr. Mora

## 2018-03-28 NOTE — PROGRESS NOTES
"Chief complaint;    Little Mitchell is a 32 y.o.  who presents complaining of pelvic pain since placement of Jennifer IUD.  The patient states that her boyfriend is feeling discomfort from IUD strings    Review of systems; denies fever chills abdominal pain, denies chest pain shortness of breath or urinary symptoms  Past medical history-  Past Medical History:   Diagnosis Date   • Hypertension      Past surgical history-No past surgical history on file.  Allergies-Asa [aspirin]  Medications-  Current Outpatient Prescriptions on File Prior to Visit   Medication Sig Dispense Refill   • famotidine (PEPCID) 20 MG Tab Take 1 Tab by mouth 2 Times a Day. 60 Tab 0   • ibuprofen (MOTRIN) 600 MG Tab Take 1 Tab by mouth every 6 hours as needed for Mild Pain. 30 Tab 0   • docusate sodium 100 MG Cap Take 100 mg by mouth 2 times a day as needed for Constipation. 60 Cap 0   • hydrocodone-acetaminophen (NORCO) 5-325 MG Tab per tablet Take 1-2 Tabs by mouth every four hours as needed. 30 Tab 0     No current facility-administered medications on file prior to visit.      Social history-  Social History     Social History   • Marital status: Single     Spouse name: N/A   • Number of children: N/A   • Years of education: N/A     Occupational History   • Not on file.     Social History Main Topics   • Smoking status: Never Smoker   • Smokeless tobacco: Not on file      Comment: denies   • Alcohol use No      Comment: denies   • Drug use: No      Comment: denies   • Sexual activity: Yes     Partners: Male     Other Topics Concern   • Not on file     Social History Narrative   • No narrative on file     Past Family History-no history of breast or ovarian cancer    Physical examination;  Alert and oriented x3  General a thin well-developed well-nourished female in no apparent distress  Vitals:    18 1330   BP: 134/88   Weight: (!) 146.5 kg (323 lb)   Height: 1.702 m (5' 7\")     Skin is warm and " dry  Neck-supple  HEENT-head-atraumatic, normocephalic, EOMI, PERRLA  Cardiovascular-regular rate and rhythm, normal S1-S2, no murmurs or gallops  Lungs-clear to auscultation bilaterally  Back-negative CVA tenderness  Abdomen-nondistended positive bowel sounds soft nontender no masses or hepatosplenomegaly  Pelvic examination;  External genitalia-no visible lesions   Vagina-no blood or discharge  Cervix-no gross lesions-IUD strings present at the cervical os IUD tip not palpable, strings trimmed  Uterus-normal size and shape, nontender  Adnexa without mass or tenderness  Extremities without cyanosis clubbing or edema  Neurologic exam grossly intact    Impression;  Pelvic pain  IUD check  Family planning    Plan;  Discussed various etiologies for pelvic pain including possible discomfort from crossbar of IUD in which case IUD needs to be removed and replaced  Discussed other forms of birth control options answered in detail including oral contraceptives Depo-Provera other forms of IUD  After thorough discussion patient elects to have IUD removed and replaced with new one.   We'll use Mirena IUD was time discussed the differences between Mirena and her existing IUD      25  Minutes spent with the patient in face-to-face contact, greater than 50% of the time spent on counseling and coordination of care. All questions answered in detail.

## 2018-04-16 ENCOUNTER — TELEPHONE (OUTPATIENT)
Dept: OBGYN | Facility: MEDICAL CENTER | Age: 33
End: 2018-04-16

## 2018-04-16 DIAGNOSIS — Z30.432 ENCOUNTER FOR IUD REMOVAL: ICD-10-CM

## 2018-04-16 NOTE — TELEPHONE ENCOUNTER
This patient is having severe left side abdominal pain and would like to have her IUD removed before getting the next one, will you please sign off on the referral and prescribe something for pain? This is a Dr. Mora Patient. Thank you

## 2018-04-17 NOTE — TELEPHONE ENCOUNTER
Called the patient and it says that the number or code dialed is incorrect, please check the number or code and try again.

## 2018-04-18 NOTE — TELEPHONE ENCOUNTER
I placed a referral to remove IUD. I would like the patient to take over the counter Motrin only,for her pain.

## 2018-04-20 ENCOUNTER — TELEPHONE (OUTPATIENT)
Dept: OBGYN | Facility: CLINIC | Age: 33
End: 2018-04-20

## 2018-04-20 NOTE — LETTER
April 20, 2018          Dear Little Mitchell,      Please call us regarding your care.  One of the nurses is available to speak with you Monday through Friday, 8 a.m. to 5 p.m.    Please call us at [phone]; thank you for your prompt attention.        Sincerely,          Shailesh Pritchett Ass't    Electronically Signed

## 2018-04-20 NOTE — TELEPHONE ENCOUNTER
Attempted to call patient about Jennifer- number still invalid. Previous [employer] message was sent as well. Will mail letter out today.

## 2018-05-11 ENCOUNTER — OFFICE VISIT (OUTPATIENT)
Dept: URGENT CARE | Facility: PHYSICIAN GROUP | Age: 33
End: 2018-05-11
Payer: COMMERCIAL

## 2018-05-11 VITALS
HEIGHT: 67 IN | BODY MASS INDEX: 45.99 KG/M2 | HEART RATE: 80 BPM | DIASTOLIC BLOOD PRESSURE: 82 MMHG | SYSTOLIC BLOOD PRESSURE: 122 MMHG | OXYGEN SATURATION: 96 % | WEIGHT: 293 LBS | RESPIRATION RATE: 16 BRPM | TEMPERATURE: 98.4 F

## 2018-05-11 DIAGNOSIS — J02.0 STREP PHARYNGITIS: ICD-10-CM

## 2018-05-11 DIAGNOSIS — E66.01 MORBID OBESITY WITH BMI OF 50.0-59.9, ADULT (HCC): ICD-10-CM

## 2018-05-11 PROCEDURE — 99203 OFFICE O/P NEW LOW 30 MIN: CPT | Performed by: PHYSICIAN ASSISTANT

## 2018-05-11 PROCEDURE — 87880 STREP A ASSAY W/OPTIC: CPT | Performed by: PHYSICIAN ASSISTANT

## 2018-05-11 RX ORDER — AMOXICILLIN 875 MG/1
875 TABLET, COATED ORAL 2 TIMES DAILY
Qty: 20 TAB | Refills: 0 | Status: SHIPPED | OUTPATIENT
Start: 2018-05-11 | End: 2019-04-26

## 2018-05-11 ASSESSMENT — ENCOUNTER SYMPTOMS
COUGH: 1
FEVER: 1
ABDOMINAL PAIN: 0
CHILLS: 0
SORE THROAT: 1
VOMITING: 0
DIARRHEA: 0
SHORTNESS OF BREATH: 0
NAUSEA: 0
WHEEZING: 0

## 2018-05-11 NOTE — PROGRESS NOTES
"  Subjective:     Little Mitchell is a 32 y.o. female who presents for Fever (Sore throat, body aches)       Fever    Associated symptoms include coughing and a sore throat. Pertinent negatives include no abdominal pain, diarrhea, nausea, rash, vomiting or wheezing.   notes last 2-3d of fever/chills/'body aches, ST, cough prod, c/o sinus congestion/ear pain, did have emesis Wednesday, denies nausea/vomiting today, c/o fatigue, denies abd pain/diarrhea/, denies PMH of asthma, denies wheeze now, PMH of bronchitis few years ago, denies PMH of pneumoina, has not been to work this week due to illness, tried using tylenol - last was 430 this am. c/o body aches. Sinus congestion.       Past Medical History:   Diagnosis Date   • Hypertension    No past surgical history on file.  Social History     Social History   • Marital status: Single     Spouse name: N/A   • Number of children: N/A   • Years of education: N/A     Occupational History   • Not on file.     Social History Main Topics   • Smoking status: Never Smoker   • Smokeless tobacco: Not on file      Comment: denies   • Alcohol use No      Comment: denies   • Drug use: No      Comment: denies   • Sexual activity: Yes     Partners: Male     Other Topics Concern   • Not on file     Social History Narrative   • No narrative on file    No family history on file. Review of Systems   Constitutional: Positive for fever. Negative for chills.   HENT: Positive for sore throat.    Respiratory: Positive for cough. Negative for shortness of breath and wheezing.    Gastrointestinal: Negative for abdominal pain, diarrhea, nausea and vomiting.   Skin: Negative for rash.     Allergies   Allergen Reactions   • Asa [Aspirin]      tachycardia   I have worn a mask for the entire encounter with this patient.    Objective:   /82   Pulse 96   Temp 36.9 °C (98.4 °F)   Resp 16   Ht 1.702 m (5' 7\")   Wt (!) 146.5 kg (323 lb)   SpO2 (!) 80%   BMI 50.59 kg/m²   Physical Exam "   Constitutional: She is oriented to person, place, and time. She appears well-developed and well-nourished. No distress.   HENT:   Head: Normocephalic and atraumatic.   Right Ear: Tympanic membrane, external ear and ear canal normal.   Left Ear: Tympanic membrane, external ear and ear canal normal.   Nose: Nose normal.   Mouth/Throat: Uvula is midline, oropharynx is clear and moist and mucous membranes are normal. No oropharyngeal exudate, posterior oropharyngeal edema, posterior oropharyngeal erythema or tonsillar abscesses. Tonsils are 1+ on the right. Tonsils are 1+ on the left. No tonsillar exudate.   Eyes: Conjunctivae and lids are normal. Right eye exhibits no discharge. Left eye exhibits no discharge. No scleral icterus.   Neck: Neck supple.   Pulmonary/Chest: Effort normal and breath sounds normal. No respiratory distress. She has no decreased breath sounds. She has no wheezes. She has no rhonchi. She has no rales.   Musculoskeletal: Normal range of motion.   Lymphadenopathy:     She has cervical adenopathy.   Neurological: She is alert and oriented to person, place, and time. She is not disoriented.   Skin: Skin is warm and dry. She is not diaphoretic. No erythema. No pallor.   Psychiatric: Her speech is normal and behavior is normal.   Nursing note and vitals reviewed.  POCT strep - POS      Assessment/Plan:   Assessment    1. Strep pharyngitis  Supportive care is reviewed with patient/caregiver - recommend to push PO fluids and electrolytes,  take full course of Rx, take with probiotics, observe for resolution  Return to clinic with lack of resolution or progression of symptoms.  Throw away toothbrush, full course of abx    - amoxicillin (AMOXIL) 875 MG tablet; Take 1 Tab by mouth 2 times a day.  Dispense: 20 Tab; Refill: 0  - lidocaine viscous 2% (XYLOCAINE) 2 % Solution; Take 5 mL by mouth as needed for Throat/Mouth Pain (q6hr PRN throat pain, ok to rinse and spit or swallow).  Dispense: 120 mL;  Refill: 0    2. Morbid obesity with BMI of 50.0-59.9, adult (HCC)    - Patient identified as having weight management issue.  Appropriate orders and counseling given.    Differential diagnosis, natural history, supportive care, and indications for immediate follow-up discussed.

## 2018-05-11 NOTE — LETTER
May 11, 2018       Patient: Little Mitchell   YOB: 1985   Date of Visit: 5/11/2018         To Whom It May Concern:    It is my medical opinion that Little Mitchell should be excused from work for Monday through Friday of this week due to illness.      If you have any questions or concerns, please don't hesitate to call [phone]          Sincerely,          Fran Ko P.A.-C.  Electronically Signed

## 2018-05-12 LAB
INT CON NEG: NEGATIVE
INT CON POS: POSITIVE
S PYO AG THROAT QL: NORMAL

## 2018-06-04 ENCOUNTER — TELEPHONE (OUTPATIENT)
Dept: OBGYN | Facility: CLINIC | Age: 33
End: 2018-06-04

## 2018-06-04 NOTE — TELEPHONE ENCOUNTER
"Called the patient back and she states that the IUD is hurting her \" left and right\" and she does not even know what to do anymore. Pt states her primary does not have the instruments to remove it and she was told she was going to have to wait until august th have it removed. Dr. Mora had an available appointment due to a cancellation so I put this pt in for  6/5/18 @ 10:30. I explained if she does not make it it will be the next available appt before she can get in.  "

## 2018-06-04 NOTE — TELEPHONE ENCOUNTER
----- Message from Myrna Carson sent at 5/30/2018  2:23 PM PDT -----  Regarding: questions  Contact: [phone]  Patient called states that she is having mayor pain and wants her iud taken out as soon as possible. I did let her know I called her back on 4/20 to schedule that procedure she states that she had an emergency and by the time she called back we were already booking out in august. She would like to know what she should do since she does not want to wait for august. Thank you.

## 2018-06-05 ENCOUNTER — GYNECOLOGY VISIT (OUTPATIENT)
Dept: OBGYN | Facility: CLINIC | Age: 33
End: 2018-06-05
Payer: COMMERCIAL

## 2018-06-05 VITALS
BODY MASS INDEX: 45.99 KG/M2 | WEIGHT: 293 LBS | HEIGHT: 67 IN | DIASTOLIC BLOOD PRESSURE: 80 MMHG | SYSTOLIC BLOOD PRESSURE: 128 MMHG

## 2018-06-05 DIAGNOSIS — Z30.432 ENCOUNTER FOR IUD REMOVAL: ICD-10-CM

## 2018-06-05 PROCEDURE — 58301 REMOVE INTRAUTERINE DEVICE: CPT | Performed by: OBSTETRICS & GYNECOLOGY

## 2018-06-05 NOTE — PROGRESS NOTES
Procedure note; Mirena IUD removal    Informed consent obtained    Vaginal speculum placed    Cervix visualized    IUD strings grasped with Shaina forceps    IUD removed intact    Patient use condoms for birth control

## 2019-01-17 ENCOUNTER — TELEPHONE (OUTPATIENT)
Dept: OBGYN | Facility: CLINIC | Age: 34
End: 2019-01-17

## 2019-01-17 NOTE — TELEPHONE ENCOUNTER
Pt called has appt on 2/6/19 for DUB, wants to know if there is something available on 24/19. Per Dona, there is nothing available for that day. Pt notified and she had no other questions

## 2019-02-01 ENCOUNTER — GYNECOLOGY VISIT (OUTPATIENT)
Dept: OBGYN | Facility: CLINIC | Age: 34
End: 2019-02-01
Payer: MEDICAID

## 2019-02-01 VITALS
BODY MASS INDEX: 45.99 KG/M2 | DIASTOLIC BLOOD PRESSURE: 74 MMHG | SYSTOLIC BLOOD PRESSURE: 128 MMHG | HEIGHT: 67 IN | WEIGHT: 293 LBS

## 2019-02-01 DIAGNOSIS — N93.8 DUB (DYSFUNCTIONAL UTERINE BLEEDING): ICD-10-CM

## 2019-02-01 PROCEDURE — 76830 TRANSVAGINAL US NON-OB: CPT | Performed by: OBSTETRICS & GYNECOLOGY

## 2019-02-01 PROCEDURE — 99214 OFFICE O/P EST MOD 30 MIN: CPT | Mod: 25 | Performed by: OBSTETRICS & GYNECOLOGY

## 2019-02-01 NOTE — PROGRESS NOTES
Chief complaint;  This patient is a 33 y.o. female , No LMP recorded. presents complaining of dysfunctional uterine bleeding.    Previous OB history significant for CPAM followed by Dr. Yepez and Dr. Tony-her child is doing well    Review of systems-denies; chest pain, shortness of breath, fever, chills, abdominal pain  Past OB history-  OB History    Para Term  AB Living   5 4 4   1 4   SAB TAB Ectopic Molar Multiple Live Births   1         4      # Outcome Date GA Lbr Dino/2nd Weight Sex Delivery Anes PTL Lv   5 Term 17 37w2d  2.734 kg (6 lb 0.4 oz) M Vag-Spont  N LUCIUS   4 Term     F Vag-Spont   LUCIUS      Birth Comments: System Generated. Please review and update pregnancy details.   3 SAB            2 Term     F Vag-Spont   LUCIUS   1 Term     F Vag-Spont   LUCIUS        Past surgical history- No past surgical history on file.  Past medical history-   Past Medical History:   Diagnosis Date   • Hypertension      Allergies- Asa [aspirin]  Present medications-   Outpatient Encounter Prescriptions as of 2019   Medication Sig Dispense Refill   • acetaminophen (TYLENOL) 325 MG Suppos Insert 325 mg in rectum every four hours as needed.     • amoxicillin (AMOXIL) 875 MG tablet Take 1 Tab by mouth 2 times a day. 20 Tab 0   • lidocaine viscous 2% (XYLOCAINE) 2 % Solution Take 5 mL by mouth as needed for Throat/Mouth Pain (q6hr PRN throat pain, ok to rinse and spit or swallow). 120 mL 0   • famotidine (PEPCID) 20 MG Tab Take 1 Tab by mouth 2 Times a Day. 60 Tab 0   • ibuprofen (MOTRIN) 600 MG Tab Take 1 Tab by mouth every 6 hours as needed for Mild Pain. 30 Tab 0   • docusate sodium 100 MG Cap Take 100 mg by mouth 2 times a day as needed for Constipation. 60 Cap 0   • hydrocodone-acetaminophen (NORCO) 5-325 MG Tab per tablet Take 1-2 Tabs by mouth every four hours as needed. 30 Tab 0     No facility-administered encounter medications on file as of 2019.      Family history- no history of breast  "or ovarian cancer  Social history-   Social History     Social History   • Marital status: Single     Spouse name: N/A   • Number of children: N/A   • Years of education: N/A     Occupational History   • Not on file.     Social History Main Topics   • Smoking status: Never Smoker   • Smokeless tobacco: Not on file      Comment: denies   • Alcohol use No      Comment: denies   • Drug use: No      Comment: denies   • Sexual activity: Yes     Partners: Male     Other Topics Concern   • Not on file     Social History Narrative   • No narrative on file         Physical examination;   Alert and oriented x3  General-well-developed well-nourished female in no apparent distress  Vitals:    02/01/19 1307   BP: 128/74   Weight: (!) 149.7 kg (330 lb)   Height: 1.702 m (5' 7\")     Skin is warm and dry   HEENT-normocephalic,nontraumatic,PERRLA,EOMI  Throat- no edema or erythema  Neck-supple  Cardiovascular-regular rate and rhythm, normal S1-S2 without murmurs or gallops  Lungs-clear to auscultation bilaterally  Back-negative CVA tenderness  Abdomen-nondistended positive bowel sounds soft nontender without masses or hepatosplenomegaly  Pelvic examination;  External genitalia-without lesions  Vagina-no blood, no discharge  Cervix-closed,no gross lesions  Uterus-  10 weeks size, nontender  Adnexa- without mass or tenderness  Extremities-without cyanosis clubbing or edema  Neurologic-grossly intact    Transvaginal ultrasound; as performed and read by myself; intrauterine gestational sac containing sepulveda pregnancy positive fetal and cardiac motion crown-rump length consistent with 9 weeks 5 days EDC 9/1/19 no obvious adnexal masses, no free pelvic fluid    Impression;  Dysfunctional uterine bleeding-evidence of ectopic or miscarriage    Plan;   Follow-up in 2-week      30 Minutes total spent with the patient in face-to-face contact,5 minutes of total time utilized to perform  Ultrasound, greater than 50% of the time has been spent " on counseling and coordination of care. Many questions answered regarding possible miscarriage.

## 2019-02-20 ENCOUNTER — TELEPHONE (OUTPATIENT)
Dept: OBGYN | Facility: CLINIC | Age: 34
End: 2019-02-20

## 2019-02-20 NOTE — TELEPHONE ENCOUNTER
"Pt called states she is very close to end 1st trimester and hasn't hear from Dr. Bueno's office. Pt is worry because her last pregnancies had complication and wants to make sure this doesn't happen again\".  States she feels baby is moving so she thinks baby is bigger than she was told.  Had an appt last week (2/6/19)but was sent back due to weather conditions comes from [place].  Would like to get referral done to see perinatology.  I will send msg to Dr. Mora to send a referral      "

## 2019-02-22 ENCOUNTER — INITIAL PRENATAL (OUTPATIENT)
Dept: OBGYN | Facility: CLINIC | Age: 34
End: 2019-02-22
Payer: MEDICAID

## 2019-02-22 ENCOUNTER — HOSPITAL ENCOUNTER (OUTPATIENT)
Facility: MEDICAL CENTER | Age: 34
End: 2019-02-22
Attending: OBSTETRICS & GYNECOLOGY
Payer: MEDICAID

## 2019-02-22 VITALS — SYSTOLIC BLOOD PRESSURE: 112 MMHG | WEIGHT: 293 LBS | BODY MASS INDEX: 51.84 KG/M2 | DIASTOLIC BLOOD PRESSURE: 70 MMHG

## 2019-02-22 DIAGNOSIS — O09.92 HIGH-RISK PREGNANCY IN SECOND TRIMESTER: ICD-10-CM

## 2019-02-22 PROCEDURE — 87624 HPV HI-RISK TYP POOLED RSLT: CPT

## 2019-02-22 PROCEDURE — 87591 N.GONORRHOEAE DNA AMP PROB: CPT

## 2019-02-22 PROCEDURE — 59402 PR NEW OB HIGH RISK: CPT | Performed by: OBSTETRICS & GYNECOLOGY

## 2019-02-22 PROCEDURE — 87491 CHLMYD TRACH DNA AMP PROBE: CPT

## 2019-02-22 PROCEDURE — 88175 CYTOPATH C/V AUTO FLUID REDO: CPT

## 2019-02-22 NOTE — PROGRESS NOTES
Initial OB;    Little Mitchell is 33 y.o.  female ,Patient's last menstrual period was 2018 (approximate). at Unknown. She denies current complaints.    Past OB history-  OB History    Para Term  AB Living   7 4 3 1 2 4   SAB TAB Ectopic Molar Multiple Live Births   2         4      # Outcome Date GA Lbr Dino/2nd Weight Sex Delivery Anes PTL Lv   7 Current            6 2018 5w0d          5 Term 17 37w2d  2.734 kg (6 lb 0.4 oz) M Vag-Spont  N LUCIUS   4 Term 11 37w0d   F Vag-Spont   LUCIUS      Birth Comments: System Generated. Please review and update pregnancy details.   3 SAB  8w0d          2 Term 10/16/07 37w0d   F Vag-Spont   LUCIUS   1  06 36w0d   F Vag-Spont   LUCIUS        Past medical history-  Past Medical History:   Diagnosis Date   • Hypertension      Past surgical history-History reviewed. No pertinent surgical history.  Allergies-Asa [aspirin]  Medications-  No current facility-administered medications for this visit.        Last reviewed on 2019  9:35 AM by Rajan Mora M.D.      Size equals dates, positive fetal heart tones    See prenatal physical;    Impression;  Viable pregnancy at 13+ weeks    Plan;  Followup in 4 weeks  Consultation with Dr. Bueno due to previous history of CPAM  Prenatal labs

## 2019-02-26 LAB
C TRACH DNA GENITAL QL NAA+PROBE: NEGATIVE
CYTOLOGY REG CYTOL: ABNORMAL
HPV HR 12 DNA CVX QL NAA+PROBE: POSITIVE
HPV16 DNA SPEC QL NAA+PROBE: NEGATIVE
HPV18 DNA SPEC QL NAA+PROBE: POSITIVE
N GONORRHOEA DNA GENITAL QL NAA+PROBE: NEGATIVE
SPECIMEN SOURCE: ABNORMAL
SPECIMEN SOURCE: ABNORMAL

## 2019-02-27 ENCOUNTER — TELEPHONE (OUTPATIENT)
Dept: OBGYN | Facility: CLINIC | Age: 34
End: 2019-02-27

## 2019-02-27 NOTE — TELEPHONE ENCOUNTER
Pt called, notified of pap smear results and need for colposcopy.  Will receive a call to set up appt for colposcopy.  Verbalized understanding.

## 2019-03-14 ENCOUNTER — GYNECOLOGY VISIT (OUTPATIENT)
Dept: OBGYN | Facility: CLINIC | Age: 34
End: 2019-03-14
Payer: MEDICAID

## 2019-03-14 VITALS — DIASTOLIC BLOOD PRESSURE: 76 MMHG | BODY MASS INDEX: 51.22 KG/M2 | SYSTOLIC BLOOD PRESSURE: 138 MMHG | WEIGHT: 293 LBS

## 2019-03-14 DIAGNOSIS — R87.610 ASCUS WITH POSITIVE HIGH RISK HPV CERVICAL: ICD-10-CM

## 2019-03-14 DIAGNOSIS — R87.810 ASCUS WITH POSITIVE HIGH RISK HPV CERVICAL: ICD-10-CM

## 2019-03-14 PROCEDURE — 57454 BX/CURETT OF CERVIX W/SCOPE: CPT | Performed by: OBSTETRICS & GYNECOLOGY

## 2019-03-14 NOTE — LETTER
COLPOSCOPY / LEEP DATA SHEET    Little Mitchell     1985      33 y.o.      Patient's last menstrual period was 11/22/2018 (approximate).     @[facility]@       Medical history:   o MVP    o Blood dyscrasia    o Rh     o Other: .    STD history:    o HPV     o HSV     o HIV     o GC     o Chlamydia     o None     o Other: .    HIV:   o Positive     o Negative                            IV Drug User:   o  Yes    o  No .    Age of first coitus:                                                Number of sex partners in lifetime:  .    Reason for exam:.    Prior abnormal PAPS?    o  Yes  o  No        Treatment: .    Prior history of condyloma?    o  Yes  o  No        Treatment:.     Colposcopic view - description:                                 Satisfactory?    o  Yes  o  No                    Squamo-columnar junction seen?  o  Yes  o  No.    Entire lesion seen?     o  Yes  o  No          PAP done?  o  Yes  o  No           Biopsies done?  o  Yes  o  No.    Biopsy sites:.    ECC done?    o  Yes  o  No      If no, reason:.    Impression:.    Recommendations:.             Colposcopist: ______________________________________________ Date: ___________________

## 2019-03-14 NOTE — PROGRESS NOTES
Procedure note; COLPOSCOPY    Indications; Pap smear; ASCUS with high risk HPV    Informed consent obtained, consent signed    Urine pregnancy test negative    Vaginal speculum placed    3% acetic acid solution applied to cervix    Coloscopy performed    Entire transformation zone visualized    Findings; acetowhite epithelium at 6 and 12:00 o'clock  Flat condyloma at 7:00 o'clock, no abnormal vascularity      Biopsies taken;    Endocervical curettage; not taken  Ectocervical biopsies; not taken    Recommend repeat colposcopy after pregnancy to perform biopsies on acetowhite epithelium.      Fetal heart tones 150 bpm taken after colposcopy.    Patient has appointment with Dr. Bueno on 28 March 2019    Rajan Mora MD

## 2019-03-19 ENCOUNTER — TELEPHONE (OUTPATIENT)
Dept: OBGYN | Facility: CLINIC | Age: 34
End: 2019-03-19

## 2019-03-19 NOTE — TELEPHONE ENCOUNTER
----- Message from Myrna Carson sent at 3/19/2019  8:43 AM PDT -----  Regarding: questions  Contact: [phone]  Patient called states that she is bleeding and is at the hospital wants to know what she should do?Thank you.    3/19/19 1043 called pt, she went to ER in [place] for vaginal bleeding, pt states she had intercourse last night and thinks that might have caused it. Pt was put on pelvic rest and was told to f/u in 1-2 days. Call transferred to Myrna to schedule appt

## 2019-03-21 ENCOUNTER — ROUTINE PRENATAL (OUTPATIENT)
Dept: OBGYN | Facility: MEDICAL CENTER | Age: 34
End: 2019-03-21
Payer: MEDICAID

## 2019-03-21 VITALS — DIASTOLIC BLOOD PRESSURE: 78 MMHG | BODY MASS INDEX: 50.9 KG/M2 | WEIGHT: 293 LBS | SYSTOLIC BLOOD PRESSURE: 125 MMHG

## 2019-03-21 DIAGNOSIS — O09.892 SUPERVISION OF OTHER HIGH RISK PREGNANCIES, SECOND TRIMESTER: Primary | ICD-10-CM

## 2019-03-21 PROBLEM — M32.9 LUPUS (HCC): Status: ACTIVE | Noted: 2019-03-21

## 2019-03-21 PROCEDURE — 90040 PR PRENATAL FOLLOW UP: CPT | Performed by: NURSE PRACTITIONER

## 2019-03-21 NOTE — PROGRESS NOTES
Ob visit @ 16w4d. Was seen on Monday at [facility] with post-coital bleeding, rhogam dose given. Pt has not done PNP or AFP,requisitions reprinted. Pt has appt with  on 3/28.

## 2019-03-21 NOTE — PROGRESS NOTES
S) Pt is a 33 y.o.   at 17w0d  gestation. Routine prenatal care today. Was seen on Monday at hospital for vaginal bleeding post intercourse and received rhogam shot.  The bleeding was some spotting and she has had No bleeding since.   Fetal movement Normal  Cramping no  VB no  LOF no   Denies dysuria. Generally feels well today. Good self-care activities identified. Denies headaches, swelling, visual changes, or epigastric pain .     O) Blood pressure 125/78, weight (!) 147.4 kg (325 lb), last menstrual period 2018, not currently breastfeeding.        Labs:       PNL: Has not done       GCT:       AFP: not done yet       GBS: N/A       Pertinent ultrasound -            A) IUP at 17w0d       S=D         Patient Active Problem List    Diagnosis Date Noted   • Morbid obesity with BMI of 50.0-59.9, adult ([facility]) 2018   • Labor and delivery, indication for care 2017   • Congenital pulmonary airway malformation (CPAM) 2017   • SLE with normal kidneys ([facility]) 2016   • High-risk pregnancy 2016   • Morbid obesity due to excess calories ([facility]) 2016          SVE: deferred         TDAP: no       FLU: no        BTL: no       : no       C/S Consent: no       IOL or C/S scheduled: no       LAST PAP: colpo 2018 ASCUS with HR HPV         P) s/s ptl vs general discomforts. Fetal movements reviewed. General ed and anticipatory guidance. Nutrition/exercise/vitamin.  Discussed bleeding precautions and discussed normalcy of spotting post intercourse.    Appt with Dr Bueno on Friday  Given reqs for PNP and AFP-doing today.  RTC 4 weeks or PRN.

## 2019-03-21 NOTE — LETTER
March 21, 2019            Little Mitchell is pregnant.  It is safe to have intercourse during pregnancy, unless instructed by a healthcare provider to refrain.        Thank you,          Hilda Napier    Electronically Signed

## 2019-04-26 ENCOUNTER — ROUTINE PRENATAL (OUTPATIENT)
Dept: OBGYN | Facility: CLINIC | Age: 34
End: 2019-04-26
Payer: MEDICAID

## 2019-04-26 VITALS — BODY MASS INDEX: 50.28 KG/M2 | DIASTOLIC BLOOD PRESSURE: 76 MMHG | SYSTOLIC BLOOD PRESSURE: 134 MMHG | WEIGHT: 293 LBS

## 2019-04-26 DIAGNOSIS — Z34.82 ENCOUNTER FOR SUPERVISION OF OTHER NORMAL PREGNANCY IN SECOND TRIMESTER: ICD-10-CM

## 2019-04-26 PROCEDURE — 90040 PR PRENATAL FOLLOW UP: CPT | Performed by: ADVANCED PRACTICE MIDWIFE

## 2019-04-26 RX ORDER — RANITIDINE 150 MG/1
150 TABLET ORAL 2 TIMES DAILY
Qty: 60 TAB | Refills: 5 | Status: SHIPPED | OUTPATIENT
Start: 2019-04-26

## 2019-04-26 NOTE — PROGRESS NOTES
Patient here for MOUNIKA visit at 22w1d of pregnancy. She affirms fetal movement, denies vaginal bleeding or cramping/regular contractions. She reports overall today she is feeling well. She does report heartburn and Rx sent.  No recent ER visits since last seen. She has not completed her labs due to how far away she lives. She is seeing Dr. Bueno today for anatomy scan. She has pregnancy history of CPAM for all children. Patient provided 1 hr order today. Adequate hydration reviewed in detail with patient. Precautions and warning signs reviewed with patient. RTC in 4 week(s) for MOUNIKA visit.

## 2019-04-26 NOTE — PROGRESS NOTES
OB F/U  Denies VB, LOF, or UC  +FM  Phone#: [phone]  Pharmacy Confirmed.  C/O: patient would like heartburn medication   Labs: per patient she will get her labs done as soon as possible, per patient she would like to wait to do all her labs until her one hr is due

## 2019-05-07 ENCOUNTER — DOCUMENTATION (OUTPATIENT)
Dept: OBGYN | Facility: CLINIC | Age: 34
End: 2019-05-07

## 2019-05-24 ENCOUNTER — APPOINTMENT (OUTPATIENT)
Dept: OBGYN | Facility: CLINIC | Age: 34
End: 2019-05-24
Payer: MEDICAID

## 2019-06-21 ENCOUNTER — ROUTINE PRENATAL (OUTPATIENT)
Dept: OBGYN | Facility: CLINIC | Age: 34
End: 2019-06-21
Payer: MEDICAID

## 2019-06-21 VITALS — BODY MASS INDEX: 48.55 KG/M2 | WEIGHT: 293 LBS | DIASTOLIC BLOOD PRESSURE: 66 MMHG | SYSTOLIC BLOOD PRESSURE: 122 MMHG

## 2019-06-21 DIAGNOSIS — Z34.83 ENCOUNTER FOR SUPERVISION OF OTHER NORMAL PREGNANCY IN THIRD TRIMESTER: ICD-10-CM

## 2019-06-21 DIAGNOSIS — Z34.93 THIRD TRIMESTER PREGNANCY: ICD-10-CM

## 2019-06-21 DIAGNOSIS — O09.93 HIGH-RISK PREGNANCY IN THIRD TRIMESTER: ICD-10-CM

## 2019-06-21 PROCEDURE — 90040 PR PRENATAL FOLLOW UP: CPT | Performed by: OBSTETRICS & GYNECOLOGY

## 2019-06-21 NOTE — LETTER
"Count Your Baby's Movements  Another step to a healthy delivery    Little Mitchell             Dept: [phone]    How Many Weeks Pregnant 1985  Date to Begin Countinw1d            How to use this chart    One way for your physician to keep track of your baby's health is by knowing how often the baby moves (or \"kicks\") in your womb.  You can help your physician to do this by using this chart every day.    Every day, you should see how many hours it takes for your baby to move 10 times.  Start in the morning, as soon as you get up.    · First, write down the time your baby moves until you get to 10.  · Check off one box every time your baby moves until you get to 10.  · Write down the time you finished counting in the last column.  · Total how long it took to count up all 10 movements.  · Finally, fill in the box that shows how long this took.  After counting 10 movements, you no longer have to count any more that day.  The next morning, just start counting again as soon as you get up.    What should you call a \"movement\"?  It is hard to say, because it will feel different from one mother to another and from one pregnancy to the next.  The important thing is that you count the movements the same way throughout your pregnancy.  If you have more questions, you should ask your physician.    Count carefully every day!  SAMPLE:  Week 28    How many hours did it take to feel 10 movements?       Start  Time     1     2     3     4     5     6     7     8     9     10   Finish Time   Mon 8:20 ·  ·  ·  ·  ·  ·  ·  ·  ·  ·  11:40                  Sat               Sun                 IMPORTANT: You should contact your physician if it takes more than two hours for you to feel 10 movements.  Each morning, write down the time and start to count the movements of your baby.  Keep track by checking off one box every time you feel one movement.  When you have felt " "10 \"kicks\", write down the time you finished counting in the last column.  Then fill in the   box (over the check babs) for the number of hours it took.  Be sure to read the complete instructions on the previous page.            "

## 2019-06-21 NOTE — LETTER
[facility]  [facility]     2019    Patient: Little Mitchell   YOB: 1985   Date of Visit: 2019       To Whom It May Concern:    Little Mitchell was seen and treated in our department on 2019.     She is no longer able to work and needs to be on modified bed rest secondary to  labor, pain, and swelling.     Sincerely,     Aurea Mendez D.O.

## 2019-06-21 NOTE — PROGRESS NOTES
OB F/U  Denies VB, LOF  +FM  Phone#: [phone]  Pharmacy Confirmed.  C/O: per patient she was seen in [place] for UC q4min. States she received medication to stop her contractions   1hr gtt and labs: per patient she has refused to do this   TDap declined   Kick count given w/ instructions   BTL declined    Dr Bueno 07/16/2019

## 2019-06-21 NOTE — PROGRESS NOTES
S: Pt presents for routine OB follow up. Good fetal movement.  No contractions, vaginal bleeding, or leakage of fluid.    Questions answered.    Patient still hasn't done her prenatal labs. States she didn't know she could do it out in [place].     States that [facility] ultrasound was normal but they weren't able to visualize a certain part of the heart and baby will need to be evaluated by NICU after birth.    She was seen at [facility] for  labor and was given terbutaline to stop her contractions.  She states that she is still having occasional contractions and that this has caused her to having a lot of anxiety.  She is also experiencing a lot of pain in her lower pelvis and does not believe that she can continue working.  She cleans houses for living.  She is requesting a letter to put her on modified bedrest so that she can go on disability.    O: /66   Wt (!) 140.6 kg (310 lb)   LMP 2018 (Exact Date)   BMI 48.55 kg/m²   Patients' weight gain, fluid intake and exercise level discussed.  Vitals, fundal height , fetal position, and FHR reviewed on flowsheet    Lab:No results found for this or any previous visit (from the past 336 hour(s)).    A/P:  33 y.o.  at 30w1d presents for routine obstetric follow-up.  Size equals dates and/or scan    1.  Continue prenatal vitamins.  2.  Fetal kick counts.  3.  Exercise at least 30 minutes daily.  4.  Drink at least 2L of water daily  5.  Labor precautions educated.  6.  Follow-up in 2 weeks.  7.  Instructed patient that she needs to do prenatal labs as I am not sure that we can continue to see her if she does not perform her lab work.  Lab work was reprinted for her today.  She is to do the prenatal labs as well as the Glucola testing.  Discussed that there is a lab in [place] that she can go to without needing an appointment.  8.  Letter written for patient to come off of work secondary to history of  labor during this pregnancy.   9.  SUA- needs growth starting at 32 weeks as well as NSTs.

## 2019-06-25 ENCOUNTER — TELEPHONE (OUTPATIENT)
Dept: OBGYN | Facility: CLINIC | Age: 34
End: 2019-06-25

## 2019-06-25 NOTE — TELEPHONE ENCOUNTER
Charity from [facility] called wanted to know what exactly we need for Prenatal Panel lab work. Information and tests provided

## 2019-07-02 ENCOUNTER — ROUTINE PRENATAL (OUTPATIENT)
Dept: OBGYN | Facility: CLINIC | Age: 34
End: 2019-07-02
Payer: MEDICAID

## 2019-07-02 VITALS — BODY MASS INDEX: 48.71 KG/M2 | DIASTOLIC BLOOD PRESSURE: 82 MMHG | WEIGHT: 293 LBS | SYSTOLIC BLOOD PRESSURE: 126 MMHG

## 2019-07-02 DIAGNOSIS — O09.93 HIGH-RISK PREGNANCY IN THIRD TRIMESTER: ICD-10-CM

## 2019-07-02 PROCEDURE — 90040 PR PRENATAL FOLLOW UP: CPT | Performed by: OBSTETRICS & GYNECOLOGY

## 2019-07-02 NOTE — PROGRESS NOTES
Pt here today for OB follow up  Pt states doing well  Reports +FM  Good # verified  Pharmacy Confirmed.

## 2019-07-02 NOTE — PROGRESS NOTES
OB Followup;    31w5d    Patient Active Problem List    Diagnosis Date Noted   • Lupus 03/21/2019   • Morbid obesity with BMI of 50.0-59.9, adult ([facility]) 05/11/2018   • Congenital pulmonary airway malformation (CPAM) 04/06/2017   • SLE with normal kidneys ([facility]) 12/05/2016   • High-risk pregnancy 11/03/2016   • Morbid obesity due to excess calories ([facility]) 11/03/2016       Vitals:    07/02/19 0806   BP: 126/82   Weight: (!) 141.1 kg (311 lb)       Patient presents for followup of OB care. Currently doing well . Good fetal movement no leakage of fluid no contractions or vaginal bleeding        Size equals dates, normal fetal heart rate      Labs; patient has appointment with Dr. Bueon for repeat ultrasound growth measurements on July 16    Size is greater than dates on fundal height measurements today    Labor precautions given  Increase oral hydration  Discussed proper weight gain during pregnancy  Continue prenatal vitamins  Increase water intake  Reviewed our group's policy on prenatal visits with all providers in the group    Followup in  2 weeks

## 2019-07-16 ENCOUNTER — ROUTINE PRENATAL (OUTPATIENT)
Dept: OBGYN | Facility: CLINIC | Age: 34
End: 2019-07-16
Payer: MEDICAID

## 2019-07-16 VITALS — DIASTOLIC BLOOD PRESSURE: 84 MMHG | BODY MASS INDEX: 48.08 KG/M2 | WEIGHT: 293 LBS | SYSTOLIC BLOOD PRESSURE: 126 MMHG

## 2019-07-16 DIAGNOSIS — O09.93 HIGH-RISK PREGNANCY IN THIRD TRIMESTER: ICD-10-CM

## 2019-07-16 PROCEDURE — 90040 PR PRENATAL FOLLOW UP: CPT | Performed by: OBSTETRICS & GYNECOLOGY

## 2019-07-16 NOTE — PROGRESS NOTES
OB Followup;    33w5d    Patient Active Problem List    Diagnosis Date Noted   • Lupus 03/21/2019   • Morbid obesity with BMI of 50.0-59.9, adult ([facility]) 05/11/2018   • Congenital pulmonary airway malformation (CPAM) 04/06/2017   • SLE with normal kidneys ([facility]) 12/05/2016   • High-risk pregnancy 11/03/2016   • Morbid obesity due to excess calories ([facility]) 11/03/2016       Vitals:    07/16/19 1028   BP: 126/84   Weight: (!) 139.3 kg (307 lb)       Patient presents for followup of OB care. Currently doing well . Good fetal movement no leakage of fluid no contractions or vaginal bleeding        Size equals dates, normal fetal heart rate          Labor precautions given  Increase oral hydration  Discussed proper weight gain during pregnancy  Continue prenatal vitamins  Increase water intake  Reviewed our group's policy on prenatal visits with all providers in the group    Followup in  2 weeks

## 2019-07-16 NOTE — PROGRESS NOTES
Pt here today for OB follow up  Pt states off and on contractions, denies vaginal bleeding  Reports +FM  Good # verified   Pharmacy Confirmed.

## 2019-07-25 ENCOUNTER — DATING (OUTPATIENT)
Dept: OBGYN | Facility: CLINIC | Age: 34
End: 2019-07-25

## 2019-07-29 ENCOUNTER — TELEPHONE (OUTPATIENT)
Dept: OBGYN | Facility: CLINIC | Age: 34
End: 2019-07-29

## 2019-07-29 NOTE — TELEPHONE ENCOUNTER
Pt c/o lost her mucus plug and now is getting UC's often and lower back pain. Wants to see if she needs to go to L&D to be seen.  Informed that loosing her mucus plug doesn't mean she is on labor.  labor precautions given as follow, heavy VB like period, water brakes, UC's lasting 5-10 minutes apart for 1-2 hrs or decrease or FM to go L&D to be seen.  Has a f/u on 7/30/19 advised to keep appt if she didn't need to go to L&D.    verbalized understanding.

## 2019-07-30 ENCOUNTER — DATING (OUTPATIENT)
Dept: OBGYN | Facility: CLINIC | Age: 34
End: 2019-07-30

## 2019-07-30 ENCOUNTER — ROUTINE PRENATAL (OUTPATIENT)
Dept: OBGYN | Facility: CLINIC | Age: 34
End: 2019-07-30
Payer: MEDICAID

## 2019-07-30 ENCOUNTER — HOSPITAL ENCOUNTER (OUTPATIENT)
Facility: MEDICAL CENTER | Age: 34
End: 2019-07-30
Attending: OBSTETRICS & GYNECOLOGY
Payer: MEDICAID

## 2019-07-30 ENCOUNTER — HOSPITAL ENCOUNTER (OUTPATIENT)
Facility: MEDICAL CENTER | Age: 34
End: 2019-07-30
Attending: OBSTETRICS & GYNECOLOGY | Admitting: OBSTETRICS & GYNECOLOGY
Payer: MEDICAID

## 2019-07-30 VITALS
HEART RATE: 92 BPM | BODY MASS INDEX: 45.99 KG/M2 | HEIGHT: 67 IN | SYSTOLIC BLOOD PRESSURE: 119 MMHG | TEMPERATURE: 96.6 F | WEIGHT: 293 LBS | DIASTOLIC BLOOD PRESSURE: 63 MMHG

## 2019-07-30 VITALS — BODY MASS INDEX: 47.77 KG/M2 | SYSTOLIC BLOOD PRESSURE: 140 MMHG | DIASTOLIC BLOOD PRESSURE: 80 MMHG | WEIGHT: 293 LBS

## 2019-07-30 DIAGNOSIS — O09.93 HIGH-RISK PREGNANCY IN THIRD TRIMESTER: ICD-10-CM

## 2019-07-30 LAB
ALBUMIN SERPL BCP-MCNC: 3.6 G/DL (ref 3.2–4.9)
ALBUMIN/GLOB SERPL: 1.1 G/DL
ALP SERPL-CCNC: 70 U/L (ref 30–99)
ALT SERPL-CCNC: 10 U/L (ref 2–50)
ANION GAP SERPL CALC-SCNC: 10 MMOL/L (ref 0–11.9)
APPEARANCE UR: CLEAR
AST SERPL-CCNC: 11 U/L (ref 12–45)
BACTERIA #/AREA URNS HPF: ABNORMAL /HPF
BASOPHILS # BLD AUTO: 0.4 % (ref 0–1.8)
BASOPHILS # BLD: 0.04 K/UL (ref 0–0.12)
BILIRUB SERPL-MCNC: 0.5 MG/DL (ref 0.1–1.5)
BILIRUB UR QL STRIP.AUTO: NEGATIVE
BUN SERPL-MCNC: 10 MG/DL (ref 8–22)
CALCIUM SERPL-MCNC: 8.8 MG/DL (ref 8.5–10.5)
CHLORIDE SERPL-SCNC: 106 MMOL/L (ref 96–112)
CO2 SERPL-SCNC: 19 MMOL/L (ref 20–33)
COLOR UR: YELLOW
CREAT SERPL-MCNC: 0.47 MG/DL (ref 0.5–1.4)
CREAT UR-MCNC: 115.2 MG/DL
EOSINOPHIL # BLD AUTO: 0.04 K/UL (ref 0–0.51)
EOSINOPHIL NFR BLD: 0.4 % (ref 0–6.9)
EPI CELLS #/AREA URNS HPF: ABNORMAL /HPF
ERYTHROCYTE [DISTWIDTH] IN BLOOD BY AUTOMATED COUNT: 43.1 FL (ref 35.9–50)
GLOBULIN SER CALC-MCNC: 3.4 G/DL (ref 1.9–3.5)
GLUCOSE SERPL-MCNC: 91 MG/DL (ref 65–99)
GLUCOSE UR STRIP.AUTO-MCNC: NEGATIVE MG/DL
HCT VFR BLD AUTO: 37.8 % (ref 37–47)
HGB BLD-MCNC: 12.3 G/DL (ref 12–16)
HYALINE CASTS #/AREA URNS LPF: ABNORMAL /LPF
IMM GRANULOCYTES # BLD AUTO: 0.04 K/UL (ref 0–0.11)
IMM GRANULOCYTES NFR BLD AUTO: 0.4 % (ref 0–0.9)
KETONES UR STRIP.AUTO-MCNC: NEGATIVE MG/DL
LEUKOCYTE ESTERASE UR QL STRIP.AUTO: ABNORMAL
LYMPHOCYTES # BLD AUTO: 2.06 K/UL (ref 1–4.8)
LYMPHOCYTES NFR BLD: 23.1 % (ref 22–41)
MCH RBC QN AUTO: 27.1 PG (ref 27–33)
MCHC RBC AUTO-ENTMCNC: 32.5 G/DL (ref 33.6–35)
MCV RBC AUTO: 83.3 FL (ref 81.4–97.8)
MICRO URNS: ABNORMAL
MONOCYTES # BLD AUTO: 0.43 K/UL (ref 0–0.85)
MONOCYTES NFR BLD AUTO: 4.8 % (ref 0–13.4)
NEUTROPHILS # BLD AUTO: 6.29 K/UL (ref 2–7.15)
NEUTROPHILS NFR BLD: 70.9 % (ref 44–72)
NITRITE UR QL STRIP.AUTO: NEGATIVE
NRBC # BLD AUTO: 0 K/UL
NRBC BLD-RTO: 0 /100 WBC
PH UR STRIP.AUTO: 5.5 [PH] (ref 5–8)
PLATELET # BLD AUTO: 205 K/UL (ref 164–446)
PMV BLD AUTO: 10.2 FL (ref 9–12.9)
POTASSIUM SERPL-SCNC: 3.9 MMOL/L (ref 3.6–5.5)
PROT SERPL-MCNC: 7 G/DL (ref 6–8.2)
PROT UR QL STRIP: NEGATIVE MG/DL
PROT UR-MCNC: 12.7 MG/DL (ref 0–15)
PROT/CREAT UR: 110 MG/G (ref 10–107)
RBC # BLD AUTO: 4.54 M/UL (ref 4.2–5.4)
RBC # URNS HPF: ABNORMAL /HPF
RBC UR QL AUTO: NEGATIVE
SODIUM SERPL-SCNC: 135 MMOL/L (ref 135–145)
SP GR UR STRIP.AUTO: 1.01
URATE SERPL-MCNC: 3.9 MG/DL (ref 1.9–8.2)
UROBILINOGEN UR STRIP.AUTO-MCNC: 0.2 MG/DL
WBC # BLD AUTO: 8.9 K/UL (ref 4.8–10.8)
WBC #/AREA URNS HPF: ABNORMAL /HPF

## 2019-07-30 PROCEDURE — 87081 CULTURE SCREEN ONLY: CPT

## 2019-07-30 PROCEDURE — 90040 PR PRENATAL FOLLOW UP: CPT | Performed by: OBSTETRICS & GYNECOLOGY

## 2019-07-30 PROCEDURE — 85025 COMPLETE CBC W/AUTO DIFF WBC: CPT

## 2019-07-30 PROCEDURE — 84550 ASSAY OF BLOOD/URIC ACID: CPT

## 2019-07-30 PROCEDURE — 36415 COLL VENOUS BLD VENIPUNCTURE: CPT

## 2019-07-30 PROCEDURE — 87150 DNA/RNA AMPLIFIED PROBE: CPT

## 2019-07-30 PROCEDURE — 81001 URINALYSIS AUTO W/SCOPE: CPT

## 2019-07-30 PROCEDURE — 84156 ASSAY OF PROTEIN URINE: CPT

## 2019-07-30 PROCEDURE — 80053 COMPREHEN METABOLIC PANEL: CPT

## 2019-07-30 PROCEDURE — 59025 FETAL NON-STRESS TEST: CPT | Performed by: OBSTETRICS & GYNECOLOGY

## 2019-07-30 PROCEDURE — 82570 ASSAY OF URINE CREATININE: CPT

## 2019-07-30 NOTE — PROGRESS NOTES
Pt here today for OB follow up  Pt states she lost her plug last Wednesday and is having contractions every 7 minutes  Reports +FM  Good # [phone]  Pharmacy Confirmed.  Chaperone offered and declined.

## 2019-07-30 NOTE — DISCHARGE INSTRUCTIONS
General Instructions:  · If you think you are in labor, time contractions (lying on your left side) from the beginning of one contraction to the beginning of the next contraction for at least one hour.  · Increase fluid intake: you should consume 10-12 8 oz glasses of non-caffeinated fluid per day.  · Report any pressure or burning on urination to your physician.  · Monitor fetal movement: If you notice an absence or decrease in fetal movement, drink a large glass of water and rest on your side.  If there is no increase in movement, call your physician or go to the hospital for further evaluation.  · Report any sudden, sharp abdominal pain.  · Report any bleeding.  Spotting or pinkish discharge is normal after vaginal exam.  You may also spot after sexual intercourse.    Pre-term Labor (<37 weeks):  Call your physician or return to the hospital if:  · You have painless regular contractions more than 4 in one hour.  · Your water breaks (remember time and color).  · You have menstrual-like cramps, a low dull backache or pressure in your pelvis or back.  · Your baby does not move enough to complete the daily kick count (10 movements in 2 hours).  · Your baby moves much less often than on the days before or you have not felt your baby move all day.  · Please review the MEDICATION LIST section of your AFTER VISIT SUMMARY document.  · Take your medication as prescribed    High Blood Pressure:  · Rest on your right or left side.  · Report any severe headaches, dizziness, blurred vision or spots before your eyes.  · Report excessive swelling of your hands, face or feet.  · Report epigastric pain (upper abdominal pain)      Introduction  Patient Name: ________________________________________________ Patient Due Date: ____________________  What is a fetal movement count?  A fetal movement count is the number of times that you feel your baby move during a certain amount of time. This may also be called a fetal kick count. A  fetal movement count is recommended for every pregnant woman. You may be asked to start counting fetal movements as early as week 28 of your pregnancy.  Pay attention to when your baby is most active. You may notice your baby's sleep and wake cycles. You may also notice things that make your baby move more. You should do a fetal movement count:  · When your baby is normally most active.  · At the same time each day.  A good time to count movements is while you are resting, after having something to eat and drink.  How do I count fetal movements?  7. Find a quiet, comfortable area. Sit, or lie down on your side.  8. Write down the date, the start time and stop time, and the number of movements that you felt between those two times. Take this information with you to your health care visits.  9. For 2 hours, count kicks, flutters, swishes, rolls, and jabs. You should feel at least 10 movements during 2 hours.  10. You may stop counting after you have felt 10 movements.  11. If you do not feel 10 movements in 2 hours, have something to eat and drink. Then, keep resting and counting for 1 hour. If you feel at least 4 movements during that hour, you may stop counting.  Contact a health care provider if:  · You feel fewer than 4 movements in 2 hours.  · Your baby is not moving like he or she usually does.  Date: ____________ Start time: ____________ Stop time: ____________ Movements: ____________  Date: ____________ Start time: ____________ Stop time: ____________ Movements: ____________  Date: ____________ Start time: ____________ Stop time: ____________ Movements: ____________  Date: ____________ Start time: ____________ Stop time: ____________ Movements: ____________  Date: ____________ Start time: ____________ Stop time: ____________ Movements: ____________  Date: ____________ Start time: ____________ Stop time: ____________ Movements: ____________  Date: ____________ Start time: ____________ Stop time: ____________  Movements: ____________  Date: ____________ Start time: ____________ Stop time: ____________ Movements: ____________  Date: ____________ Start time: ____________ Stop time: ____________ Movements: ____________  This information is not intended to replace advice given to you by your health care provider. Make sure you discuss any questions you have with your health care provider.  Document Released: 01/17/2008 Document Revised: 08/16/2017 Document Reviewed: 01/26/2017  Elsevier Interactive Patient Education © 2017 [employer]        Other Instructions:  Please carefully review your entire AFTER VISIT SUMMARY document for all discharge instructions.

## 2019-07-30 NOTE — PROGRESS NOTES
Patient comes over from [facility] with borderline pressures.  Labs for PIH ordered. Patient feeling fetal movement. Denies leaking.  States that she does not feel well.  Labs reviewed with Dr Garza.  OK to use only toco at 1148.  SVE at 1230 is 2/40/-2.  Recheck at 1330 is unchanged.  This is reviewed with Dr Garza.  Patient to be discharged.  Patient and FOB planning on staying in [facility] as Little does not feel up to driving back to [place].  Discharge instructions given and reviewed including reasons to return and kick counts.  Patient ambulated out.

## 2019-07-30 NOTE — PROGRESS NOTES
OB Followup;    35w5d    Patient Active Problem List    Diagnosis Date Noted   • Lupus (HCC) 2019   • Morbid obesity with BMI of 50.0-59.9, adult ([facility]) 2018   • Congenital pulmonary airway malformation (CPAM) 2017   • SLE with normal kidneys ([facility]) 2016   • High-risk pregnancy 2016   • Morbid obesity due to excess calories ([facility]) 2016       Vitals:    19 0942   BP: 140/80   Weight: (!) 138.3 kg (305 lb)       Patient presents for followup of OB care. Currently doing well . Good fetal movement no leakage of fluid no contractions or vaginal bleeding    Patient states she lost her mucous plug and has contractions every 10 minutes.    Patient requests induction of labor today due to geographic reasons the patient lives 3[place]/2 hours from [place]    I discussed with her that she is still     Size equals dates, normal fetal heart rate    Cervix-closed thick      Labs; GBS culture today    Labor precautions given  Increase oral hydration  Discussed proper weight gain during pregnancy  Continue prenatal vitamins  Increase water intake  Reviewed our group's policy on prenatal visits with all providers in the group    Followup in  1 weeks    Patient sent to labor and delivery for serial blood pressure checks and PIH labs.

## 2019-07-31 LAB — GP B STREP DNA SPEC QL NAA+PROBE: NEGATIVE

## 2019-08-07 ENCOUNTER — TELEPHONE (OUTPATIENT)
Dept: OBGYN | Facility: CLINIC | Age: 34
End: 2019-08-07

## 2019-08-07 NOTE — TELEPHONE ENCOUNTER
Pt called stating she had to cancel her Ob appt for this Friday due to no transportation and pt lives in [place] and pt wants to know if she will be induced at 38 weeks due to her Lupus, because that is what the ER doctor in [place].  Adv pt she can discuss this with Dr. Mora at her next appt. Labor precautions given to pt and notified her when to come to L&D. Pt understood and will comply. Pt had no other questions or concerns

## 2019-08-11 ENCOUNTER — HOSPITAL ENCOUNTER (OUTPATIENT)
Facility: MEDICAL CENTER | Age: 34
End: 2019-08-12
Attending: OBSTETRICS & GYNECOLOGY | Admitting: OBSTETRICS & GYNECOLOGY
Payer: MEDICAID

## 2019-08-12 VITALS
DIASTOLIC BLOOD PRESSURE: 60 MMHG | HEART RATE: 86 BPM | WEIGHT: 293 LBS | HEIGHT: 67 IN | BODY MASS INDEX: 45.99 KG/M2 | RESPIRATION RATE: 20 BRPM | SYSTOLIC BLOOD PRESSURE: 112 MMHG | TEMPERATURE: 98.1 F

## 2019-08-12 PROCEDURE — 59025 FETAL NON-STRESS TEST: CPT

## 2019-08-12 PROCEDURE — 99214 OFFICE O/P EST MOD 30 MIN: CPT | Performed by: OBSTETRICS & GYNECOLOGY

## 2019-08-12 NOTE — PROGRESS NOTES
"2345- pt is a , 37.4 weeks gestation IUP, arriving from [facility] from personal vehicle after discharge with c/o uc's, HA x4days, edema to hands and feet x1 weeks, elevated Bps 4-5 days at home, dizziness and visual disturbances with ambulation. No c/o lof, RU gastic pain, bleeding or dfm. No edema noted to hands or feet, DTRs 2+. Pt brought copy of labs from hospital in [place], wnl, pt also states \"they checked my cervix twice while there and was 2cm both times\". efm and toco placed, vss.   0040- sve performed, 3/40/-3.   0045- Dr. Flores called and updated, received orders for reactive nst, cycle BP and will come to see pt  0140- Dr. Flores at pt bedside discussing poc for discharge due to normal labs and BP's, pt requesting form to release labs from hospital in [place].  0210- discussed poc with pt for discharge with labor precautions, monitors removed, left to change clothes.  0223- left off floor with family at side.  "

## 2019-08-12 NOTE — PROGRESS NOTES
Triage Note   34 y.o.  morbidly obese female , at 37w4d , presents this early AM , after driving from [place] . Patient claims she went to ER there secondary to Headache, and when all labs , BP's recorded as normal , she said they told her to come here.   PAtient with some erratic appointments at [facility]  Past Medical History:   Diagnosis Date   • Hypertension      Patient Active Problem List    Diagnosis Date Noted   • Lupus ([facility]) 2019   • Morbid obesity with BMI of 50.0-59.9, adult ([facility]) 2018   • Congenital pulmonary airway malformation (CPAM) 2017   • SLE with normal kidneys ([facility]) 2016   • High-risk pregnancy 2016   • Morbid obesity due to excess calories ([facility]) 2016     OB History    Para Term  AB Living   7 4 3 1 2 4   SAB TAB Ectopic Molar Multiple Live Births   2 0 0 0 0 4     Social History     Socioeconomic History   • Marital status: Single     Spouse name: Not on file   • Number of children: Not on file   • Years of education: Not on file   • Highest education level: Not on file   Occupational History   • Not on file   Social Needs   • Financial resource strain: Not on file   • Food insecurity:     Worry: Not on file     Inability: Not on file   • Transportation needs:     Medical: Not on file     Non-medical: Not on file   Tobacco Use   • Smoking status: Never Smoker   • Smokeless tobacco: Never Used   • Tobacco comment: denies   Substance and Sexual Activity   • Alcohol use: No     Comment: denies   • Drug use: No     Comment: denies   • Sexual activity: Yes     Partners: Male   Lifestyle   • Physical activity:     Days per week: Not on file     Minutes per session: Not on file   • Stress: Not on file   Relationships   • Social connections:     Talks on phone: Not on file     Gets together: Not on file     Attends [employer] service: Not on file     Active member of club or organization: Not on file     Attends meetings of clubs or organizations: Not  "on file     Relationship status: Not on file   • Intimate partner violence:     Fear of current or ex partner: Not on file     Emotionally abused: Not on file     Physically abused: Not on file     Forced sexual activity: Not on file   Other Topics Concern   • Not on file   Social History Narrative   • Not on file       Vitals:    08/12/19 0001 08/12/19 0051 08/12/19 0106 08/12/19 0120   BP: 125/72 133/64 111/56 112/60   Pulse: (!) 106 87 86 86   Resp: 20      Temp: 36.7 °C (98.1 °F)      TempSrc: Temporal      Weight: (!) 137.4 kg (303 lb)      Height: 1.702 m (5' 7\")      FHT; 140's : reactive , no decelerations   Cx: 1-2/40%/-3   LABS :   No results for input(s): WBC, RBC, HEMOGLOBIN, HEMATOCRIT, MCV, MCH, RDW, PLATELETCT, MPV, NEUTSPOLYS, LYMPHOCYTES, MONOCYTES, EOSINOPHILS, BASOPHILS, RBCMORPHOLO in the last 72 hours.  REVIEWED FROM [facility]: all normal , no evidence for Pre eclapmsia   Urine protein : negative     Ass: 37w6d   Morbidly obese  No evidence for HTN, preeclampsia   No evidence for labor   Evaluation and clinical decision making , including analysis of Fetal data and maternal lab work completed over a 45 minutes period.   P. D/C home with labor instructions and F/U appt . At office       "

## 2019-08-13 ENCOUNTER — ROUTINE PRENATAL (OUTPATIENT)
Dept: OBGYN | Facility: CLINIC | Age: 34
End: 2019-08-13
Payer: MEDICAID

## 2019-08-13 VITALS — SYSTOLIC BLOOD PRESSURE: 134 MMHG | WEIGHT: 293 LBS | BODY MASS INDEX: 48.4 KG/M2 | DIASTOLIC BLOOD PRESSURE: 76 MMHG

## 2019-08-13 DIAGNOSIS — Z34.83 ENCOUNTER FOR SUPERVISION OF OTHER NORMAL PREGNANCY IN THIRD TRIMESTER: ICD-10-CM

## 2019-08-13 PROCEDURE — 90040 PR PRENATAL FOLLOW UP: CPT | Performed by: OBSTETRICS & GYNECOLOGY

## 2019-08-13 NOTE — PROGRESS NOTES
OB Followup;    37w5d    Patient Active Problem List    Diagnosis Date Noted   • Lupus (HCC) 2019   • Morbid obesity with BMI of 50.0-59.9, adult ([facility]) 2018   • Congenital pulmonary airway malformation (CPAM) 2017   • SLE with normal kidneys ([facility]) 2016   • High-risk pregnancy 2016   • Morbid obesity due to excess calories ([facility]) 2016       Vitals:    19 1106   BP: 134/76   Weight: (!) 140.2 kg (309 lb)       Patient presents for followup of OB care. Currently doing well . Good fetal movement no leakage of fluid no contractions or vaginal bleeding        Size equals dates, normal fetal heart rate      Labs; PIH labs performed 2 weeks ago on labor and delivery normal  Blood pressure today 134/76-normal    Labor precautions given  Increase oral hydration  Discussed proper weight gain during pregnancy  Continue prenatal vitamins  Increase water intake  Reviewed our group's policy on prenatal visits with all providers in the group    Followup in  1 weeks     Patient has been requesting induction of labor now in for the previous several weeks we discussed the risk of  delivery-the patient is concerned that she will have to deliver in [place] at the hospital there.    Most recent ultrasound with Dr. Bueno shows growth at 28th percentile with JAQUAN of 14.4 and normal three-vessel umbilical cord.    Elective induction of labor for 39-0/7 weeks-referral placed      Patient states she refused to come in for prenatal visits as indicated  We discussed the risk of problems with the pregnancy if she does not come in for her scheduled appointments.

## 2019-08-13 NOTE — PROGRESS NOTES
Pt here today for OB follow up  Negative GBS  Reports +FM  WT: 309 lb  BP: 134/76  Pt states she had been having contractions 2-5 minutes apart x 3 days and pelvic pressure. Headaches x 6 days and feet swelling.   Good # [phone]

## 2019-08-13 NOTE — LETTER
August 13, 2019          To Whom it may concern:    Please excuse Bear Levine from work for the following dates 08/12/19, 08/13/19, and 08/14/2019 due to wife's  Estrellita Mari Stephen doctor's appointments.     Thank you,          Rajan Mora M.D.    Electronically Signed

## 2019-08-15 ENCOUNTER — TELEPHONE (OUTPATIENT)
Dept: OBGYN | Facility: MEDICAL CENTER | Age: 34
End: 2019-08-15

## 2019-08-15 NOTE — TELEPHONE ENCOUNTER
Pt called inquiring about her IOL date. Pt was informed that her referral stated that she needed to have 39 week IOL and that I had no openings to schedule IOL. Pt explained that her  needs as much notice as possible as he drives trucks and needs to put in notice at his job. Pt was informed that I would look and see if any scheduled IOL had delivered on the date needed. I was able to get pt scheduled for 8/23 at 10 am. Pt was very happy with this information and asked if anything became available for the 22nd that she would still like that day if possible. Pt was assured that if something became available that I would get her scheduled on that date. GREGOR

## 2019-08-20 ENCOUNTER — TELEPHONE (OUTPATIENT)
Dept: OBGYN | Facility: CLINIC | Age: 34
End: 2019-08-20

## 2019-08-20 NOTE — TELEPHONE ENCOUNTER
Pt called inquiring about earlier IOL date. Pt stated that she went to the hospital in [place] last night d/t feeling pressure. Pt stated that at the hospital they told her that the baby needed to come out because she was having a Lupus flare up. Pt was inquiring as to what she should do. Pt stated that the hospital sent her home and told her she would need to get her records from [facility] so they could deliver her. Pt stated that she had elevated BP and stated that the NST was normal and baby was happy. Pt was informed that as I am not clinical she would need to talk to a MA but that I would relay the information and get her to the correct person. Pt was transferred to [facility] PENALOZA. GREGOR

## 2019-08-22 ENCOUNTER — HOSPITAL ENCOUNTER (OUTPATIENT)
Facility: MEDICAL CENTER | Age: 34
End: 2019-08-22
Attending: OBSTETRICS & GYNECOLOGY | Admitting: OBSTETRICS & GYNECOLOGY
Payer: MEDICAID

## 2019-09-12 ENCOUNTER — TELEPHONE (OUTPATIENT)
Dept: OBGYN | Facility: CLINIC | Age: 34
End: 2019-09-12

## 2019-09-12 NOTE — TELEPHONE ENCOUNTER
Pt called asking to get an Rx to help with her milk production, states last pregnancy was given by Dr. Mortensen. Pt delivered in [place] on 8/22/19.  Transferred to set up an appt. For her PP     I will send a msg to Dr. Perez's for prescription.

## 2021-01-25 ENCOUNTER — NON-PROVIDER VISIT (OUTPATIENT)
Dept: URGENT CARE | Facility: PHYSICIAN GROUP | Age: 36
End: 2021-01-25

## 2021-01-25 DIAGNOSIS — Z02.1 PRE-EMPLOYMENT DRUG SCREENING: ICD-10-CM

## 2021-01-25 LAB
AMP AMPHETAMINE: NORMAL
BAR BARBITURATES: NORMAL
BZO BENZODIAZEPINES: NORMAL
COC COCAINE: NORMAL
INT CON NEG: NORMAL
INT CON POS: NORMAL
MDMA ECSTASY: NORMAL
MET METHAMPHETAMINES: NORMAL
MTD METHADONE: NORMAL
OPI OPIATES: NORMAL
OXY OXYCODONE: NORMAL
PCP PHENCYCLIDINE: NORMAL
POC URINE DRUG SCREEN OCDRS: NEGATIVE
THC: NORMAL

## 2021-01-25 PROCEDURE — 80305 DRUG TEST PRSMV DIR OPT OBS: CPT | Performed by: PHYSICIAN ASSISTANT

## 2022-07-20 NOTE — PROGRESS NOTES
OB Follow up today   Pt doing good   +movement  No LOF, VB or UC   C/o александр friend    altered mental status

## 2023-01-11 NOTE — TELEPHONE ENCOUNTER
09/03/19 09:32    Pt called stating she went to [facility] for elevated BP's and the stated to get records from [facility] because she needs delivered (has Lupus). Consulted with Dr. Stearns, and triaged pt phone call, pt c/o having a headache in the back of her head for 10 days straight. States staying hydrated. BP at [facility] highest was 142/82. Pt calls today to get guidance as she is a little worried. I gave pt options to go back to [place] ED and have them deliver, come to [place] [facility] with c/o of everything she has, or keep appt tomorrow with Dr. Watters. Pt has IOL scheduled on Thursday 8/23/19. I strongly encouraged pt to be seen sooner than later with having a HA for 10 days. Pt understood instructions and was going to go to ED.     Pt called back and decided to stay in [place] and come tomorrow for her appt with Dr. Watters.   Const: Awake, alert and oriented. In no acute distress. Well appearing.  HEENT: NC/AT. Moist mucous membranes.  Eyes: No scleral icterus. EOMI.  Neck:. Soft and supple. Full ROM without pain.  Cardiac: Regular rate and regular rhythm. +S1/S2. Peripheral pulses 2+ and symmetric. No LE edema.  Resp: Speaking in full sentences. No evidence of respiratory distress. No wheezes, rales or rhonchi.  Abd: Soft, non-tender, non-distended. Normal bowel sounds in all 4 quadrants. No guarding or rebound.  Back: Spine midline and non-tender. No CVAT. (+) reproducible paracervical TTP, and reproducible right chest wall TTP  Skin: No rashes, abrasions or lacerations.  Lymph: No cervical lymphadenopathy.  Neuro: A&Ox3, moving all extremities symmetrically, follows commands, motor rishabh upper and lower ext 5/5, sensory symm and intact CN 2-12 grossly intact, no ataxia, no nystagmus, no dysmetria, no ddk, symm rishabh, no pronator drift

## 2023-01-20 NOTE — TELEPHONE ENCOUNTER
Pt called stating she would like to go back to work sooner than her 6 weeks. As early as 5/31/2017 or Monday of next week. Pt also states that [facility] has been calling her and told her her insurance is not active and that we will need to put in a new referral.    step to step